# Patient Record
Sex: MALE | Race: WHITE | Employment: OTHER | URBAN - METROPOLITAN AREA
[De-identification: names, ages, dates, MRNs, and addresses within clinical notes are randomized per-mention and may not be internally consistent; named-entity substitution may affect disease eponyms.]

---

## 2017-02-15 DIAGNOSIS — N52.8 OTHER MALE ERECTILE DYSFUNCTION: ICD-10-CM

## 2017-02-16 RX ORDER — SILDENAFIL 100 MG/1
100 TABLET, FILM COATED ORAL DAILY PRN
Qty: 6 TABLET | Refills: 2 | Status: SHIPPED | OUTPATIENT
Start: 2017-02-16 | End: 2018-03-19

## 2017-02-16 NOTE — TELEPHONE ENCOUNTER
Prescription approved per [facility] Refill Protocol.  Kiersten Auguste PharmD   [facility]  [email or web address].

## 2017-08-09 DIAGNOSIS — K21.9 ESOPHAGEAL REFLUX: Primary | ICD-10-CM

## 2017-08-09 NOTE — TELEPHONE ENCOUNTER
Omeprazole       Last Written Prescription Date: 8/31/16  Last Fill Quantity: 90,  # refills: 3   Last Office Visit with [facility], [facility] or [facility] prescribing provider: 11/1/16                                         Next 5 appointments (look out 90 days)     Sep 05, 2017  8:20 AM CDT   PHYSICAL with Michael Ni MD   [facility] ([facility])    [address]  [facility] [id number] [phone]

## 2017-09-05 ENCOUNTER — OFFICE VISIT (OUTPATIENT)
Dept: INTERNAL MEDICINE | Facility: CLINIC | Age: 77
End: 2017-09-05
Payer: MEDICARE

## 2017-09-05 VITALS
TEMPERATURE: 97.8 F | WEIGHT: 203.9 LBS | OXYGEN SATURATION: 98 % | BODY MASS INDEX: 27.02 KG/M2 | DIASTOLIC BLOOD PRESSURE: 78 MMHG | HEIGHT: 73 IN | SYSTOLIC BLOOD PRESSURE: 134 MMHG | HEART RATE: 84 BPM

## 2017-09-05 DIAGNOSIS — Z12.5 SPECIAL SCREENING FOR MALIGNANT NEOPLASM OF PROSTATE: ICD-10-CM

## 2017-09-05 DIAGNOSIS — Z23 NEED FOR PNEUMOCOCCAL VACCINATION: ICD-10-CM

## 2017-09-05 DIAGNOSIS — K21.9 GASTROESOPHAGEAL REFLUX DISEASE WITHOUT ESOPHAGITIS: ICD-10-CM

## 2017-09-05 DIAGNOSIS — R39.198 SLOWING OF URINARY STREAM: ICD-10-CM

## 2017-09-05 DIAGNOSIS — Z00.00 ROUTINE GENERAL MEDICAL EXAMINATION AT A HEALTH CARE FACILITY: Primary | ICD-10-CM

## 2017-09-05 LAB
ALBUMIN SERPL-MCNC: 3.9 G/DL (ref 3.4–5)
ALP SERPL-CCNC: 60 U/L (ref 40–150)
ALT SERPL W P-5'-P-CCNC: 24 U/L (ref 0–70)
ANION GAP SERPL CALCULATED.3IONS-SCNC: 6 MMOL/L (ref 3–14)
AST SERPL W P-5'-P-CCNC: 17 U/L (ref 0–45)
BILIRUB SERPL-MCNC: 0.6 MG/DL (ref 0.2–1.3)
BUN SERPL-MCNC: 15 MG/DL (ref 7–30)
CALCIUM SERPL-MCNC: 9.3 MG/DL (ref 8.5–10.1)
CHLORIDE SERPL-SCNC: 104 MMOL/L (ref 94–109)
CHOLEST SERPL-MCNC: 199 MG/DL
CO2 SERPL-SCNC: 30 MMOL/L (ref 20–32)
CREAT SERPL-MCNC: 0.96 MG/DL (ref 0.66–1.25)
ERYTHROCYTE [DISTWIDTH] IN BLOOD BY AUTOMATED COUNT: 13.2 % (ref 10–15)
GFR SERPL CREATININE-BSD FRML MDRD: 76 ML/MIN/1.7M2
GLUCOSE SERPL-MCNC: 102 MG/DL (ref 70–99)
HCT VFR BLD AUTO: 40.5 % (ref 40–53)
HDLC SERPL-MCNC: 61 MG/DL
HGB BLD-MCNC: 13.1 G/DL (ref 13.3–17.7)
LDLC SERPL CALC-MCNC: 118 MG/DL
MCH RBC QN AUTO: 28.1 PG (ref 26.5–33)
MCHC RBC AUTO-ENTMCNC: 32.3 G/DL (ref 31.5–36.5)
MCV RBC AUTO: 87 FL (ref 78–100)
NONHDLC SERPL-MCNC: 138 MG/DL
PLATELET # BLD AUTO: 133 10E9/L (ref 150–450)
POTASSIUM SERPL-SCNC: 4.6 MMOL/L (ref 3.4–5.3)
PROT SERPL-MCNC: 7.1 G/DL (ref 6.8–8.8)
PSA SERPL-ACNC: 4.08 UG/L (ref 0–4)
RBC # BLD AUTO: 4.67 10E12/L (ref 4.4–5.9)
SODIUM SERPL-SCNC: 140 MMOL/L (ref 133–144)
TRIGL SERPL-MCNC: 101 MG/DL
TSH SERPL DL<=0.005 MIU/L-ACNC: 1.76 MU/L (ref 0.4–4)
WBC # BLD AUTO: 5.9 10E9/L (ref 4–11)

## 2017-09-05 PROCEDURE — 90670 PCV13 VACCINE IM: CPT | Performed by: INTERNAL MEDICINE

## 2017-09-05 PROCEDURE — G0009 ADMIN PNEUMOCOCCAL VACCINE: HCPCS | Performed by: INTERNAL MEDICINE

## 2017-09-05 PROCEDURE — G0439 PPPS, SUBSEQ VISIT: HCPCS | Performed by: INTERNAL MEDICINE

## 2017-09-05 PROCEDURE — 80061 LIPID PANEL: CPT | Performed by: INTERNAL MEDICINE

## 2017-09-05 PROCEDURE — 36415 COLL VENOUS BLD VENIPUNCTURE: CPT | Performed by: INTERNAL MEDICINE

## 2017-09-05 PROCEDURE — 85027 COMPLETE CBC AUTOMATED: CPT | Performed by: INTERNAL MEDICINE

## 2017-09-05 PROCEDURE — 80053 COMPREHEN METABOLIC PANEL: CPT | Performed by: INTERNAL MEDICINE

## 2017-09-05 PROCEDURE — G0103 PSA SCREENING: HCPCS | Performed by: INTERNAL MEDICINE

## 2017-09-05 PROCEDURE — 84443 ASSAY THYROID STIM HORMONE: CPT | Performed by: INTERNAL MEDICINE

## 2017-09-05 RX ORDER — FINASTERIDE 5 MG/1
1 TABLET, FILM COATED ORAL DAILY
Qty: 90 TABLET | Refills: 3 | Status: SHIPPED | OUTPATIENT
Start: 2017-09-05 | End: 2018-09-12

## 2017-09-05 NOTE — PROGRESS NOTES
SUBJECTIVE:   Jack Bro is a 77 year old male who presents for Preventive Visit.    Are you in the first 12 months of your Medicare Part B coverage?  No    Healthy Habits:    Do you get at least three servings of calcium containing foods daily (dairy, green leafy vegetables, etc.)? yes    Amount of exercise or daily activities, outside of work: 3 day(s) per week    Problems taking medications regularly No    Medication side effects: Yes , gas from the Omeprazole    Have you had an eye exam in the past two years? yes    Do you see a dentist twice per year? yes    Do you have sleep apnea, excessive snoring or daytime drowsiness?no    Gas  The patient relates gas from the omeprazole. He states that currently he takes capsule pills.    Exercise   The patient relates he walks regularly. He reports walking for at least 30 minutes per day, 5 days a week.    Past/recent records reviewed and discussed for:  Follows with a chiropractor for lower back pain.  -Right inguinal herniorrhaphy in 11/2016.  -Colonoscopy in 2014, normal. No repeat needed due to age.    COGNITIVE SCREEN  1) Repeat 3 items (Banana, Sunrise, Chair)    2) Clock draw: NORMAL  3) 3 item recall: Recalls 3 objects  Results: 3 items recalled: COGNITIVE IMPAIRMENT LESS LIKELY    Mini-CogTM Copyright S Dontae. Licensed by the author for use in [facility]; reprinted with permission (sajan@.[email or web address]). All rights reserved.      Reviewed and updated as needed this visit by clinical staffTobacco  Allergies  Meds  Med Hx  Surg Hx  Fam Hx  Soc Hx        Reviewed and updated as needed this visit by Provider        Social History   Substance Use Topics     Smoking status: Former Smoker     Packs/day: 0.50     Years: 20.00     Types: Cigarettes     Quit date: 9/22/1984     Smokeless tobacco: Never Used     Alcohol use 0.0 oz/week     0 Standard drinks or equivalent per week      Comment: About two drinks weekly       The patient does not drink >3  drinks per day nor >7 drinks per week.    Today's PHQ-2 Score:   PHQ-2 ( 1999 Pfizer) 9/5/2017 8/31/2016   Q1: Little interest or pleasure in doing things 0 0   Q2: Feeling down, depressed or hopeless 0 0   PHQ-2 Score 0 0         Do you feel safe in your environment - Yes    Do you have a Health Care Directive?: Yes: Patient states has Advance Directive and will bring in a copy to clinic.    Current providers sharing in care for this patient include: Patient Care Team:  Michael Ni MD as PCP - General      Hearing impairment: Yes, wears hearing aids    Ability to successfully perform activities of daily living: Yes, no assistance needed     Fall risk:  Fallen 2 or more times in the past year?: No  Any fall with injury in the past year?: No      Home safety:  none identified      The following health maintenance items are reviewed in Epic and correct as of today:Health Maintenance   Topic Date Due     PNEUMOCOCCAL (2 of 2 - PCV13) 03/11/2009     MEDICARE ANNUAL WELLNESS VISIT  08/31/2017     FALL RISK ASSESSMENT  08/31/2017     INFLUENZA VACCINE (SYSTEM ASSIGNED)  09/01/2017     ADVANCE DIRECTIVE PLANNING Q5 YRS  06/13/2021     LIPID MONITORING Q5 YEARS  08/31/2021     TETANUS Q10 YR  09/14/2022     COLONOSCOPY Q10 YR  08/05/2024       Pneumonia Vaccine:Adults age 65+ who received Pneumovax (PPSV23) at 65 years or older: Should be given PCV13 > 1 year after their most recent PPSV23    ROS:  C: POSITIVE for difficulty sleeping, NEGATIVE for fever, chills, change in weight  I: NEGATIVE for worrisome rashes, moles or lesions  E: POSITIVE for cataract of left eye  E/M: NEGATIVE for ear, mouth and throat problems  R: NEGATIVE for significant cough or SOB  B: NEGATIVE for masses, tenderness or discharge  CV: NEGATIVE for chest pain, palpitations or peripheral edema  GI: POSITIVE for abdominal gas, NEGATIVE for nausea, abdominal pain, heartburn, or change in bowel habits  : NEGATIVE for frequency, dysuria, or  "hematuria  M:POSITIVE for lower back pain  N: NEGATIVE for weakness, dizziness or paresthesias  E: NEGATIVE for temperature intolerance, skin/hair changes  H: NEGATIVE for bleeding problems  P: NEGATIVE for changes in mood or affect    This document serves as a record of the services and decisions personally performed and made by Michael Ni MD. It was created on his behalf by Dolly Mederos, a trained medical scribe. The creation of this document is based on the provider's statements to the medical scribe.  Dolly Mederos September 5, 2017 9:11 AM     OBJECTIVE:   /78 (BP Location: Right arm, Patient Position: Sitting, Cuff Size: Adult Large)  Pulse 84  Temp 97.8  F (36.6  C) (Oral)  Ht 6' 0.83\" (1.85 m)  Wt 203 lb 14.4 oz (92.5 kg)  SpO2 98%  BMI 27.02 kg/m2 Estimated body mass index is 27.02 kg/(m^2) as calculated from the following:    Height as of this encounter: 6' 0.83\" (1.85 m).    Weight as of this encounter: 203 lb 14.4 oz (92.5 kg).  EXAM:   GENERAL: healthy, alert and no distress  EYES: Eyes grossly normal to inspection, PERRL and conjunctivae and sclerae normal  HENT: Wax in both ear canals, unable to remove with curette. TM's not visible, nose and mouth without ulcers or lesions  NECK: no adenopathy, no asymmetry, masses, or scars and thyroid normal to palpation  RESP: lungs clear to auscultation - no rales, rhonchi or wheezes  CV: regular rate and rhythm, normal S1 S2, no S3 or S4, no murmur, click or rub, no peripheral edema and peripheral pulses strong  ABDOMEN: soft, nontender, no hepatosplenomegaly, no masses and bowel sounds normal   (male): normal male genitalia without lesions or urethral discharge, no hernia  RECTAL: normal sphincter tone, no rectal masses, prostate normal size, smooth, nontender without nodules or masses  MS: no gross musculoskeletal defects noted, no edema  SKIN: no suspicious lesions or rashes  NEURO: Normal strength and tone, mentation intact and speech " "normal  PSYCH: mentation appears normal, affect normal/bright  LYMPH: no cervical, supraclavicular, axillary, or inguinal adenopathy    ASSESSMENT / PLAN:   (Z00.00) Routine general medical examination at a health care facility  (primary encounter diagnosis)  Comment: Stable health. See epic orders.   Plan: Comprehensive metabolic panel, Lipid panel         reflex to direct LDL, TSH with free T4 reflex,         CBC with platelets        Follow up yearly.    (K21.9) Gastroesophageal reflux disease without esophagitis  Comment: Well controlled. Continue current meds.  Plan: omeprazole (PRILOSEC) 20 MG CR capsule          (R39.198) Slowing of urinary stream  Comment: Well controlled. Continue current meds.   Plan: finasteride (PROSCAR) 5 MG tablet          (Z23) Need for pneumococcal vaccination  Plan: Pneumococcal vaccine 13 valent PCV13 IM         (Prevnar) [45223], ADMIN MEDICARE: Pneumococcal        Vaccine ()          (Z12.5) Special screening for malignant neoplasm of prostate  Plan: Prostate spec antigen screen    End of Life Planning:  Patient currently has an advanced directive: Yes.  Practitioner is supportive of decision.    COUNSELING:  Reviewed preventive health counseling, as reflected in patient instructions  Special attention given to:       Regular exercise       Healthy diet/nutrition       Immunizations    Vaccinated for: Pneumococcal         Colon cancer screening       Prostate cancer screening      BP Screening:   Last 3 BP Readings:    BP Readings from Last 3 Encounters:   09/05/17 134/78   11/10/16 136/85   11/01/16 138/70       The following was recommended to the patient:  Re-screen BP within a year and recommended lifestyle modifications    Estimated body mass index is 27.02 kg/(m^2) as calculated from the following:    Height as of this encounter: 6' 0.83\" (1.85 m).    Weight as of this encounter: 203 lb 14.4 oz (92.5 kg).     reports that he quit smoking about 32 years ago. His smoking " use included Cigarettes. He has a 10.00 pack-year smoking history. He has never used smokeless tobacco.      Appropriate preventive services were discussed with this patient, including applicable screening as appropriate for cardiovascular disease, diabetes, osteopenia/osteoporosis, and glaucoma.  As appropriate for age/gender, discussed screening for colorectal cancer, prostate cancer, breast cancer, and cervical cancer. Checklist reviewing preventive services available has been given to the patient.    Reviewed patients plan of care and provided an AVS. The Basic Care Plan (routine screening as documented in Health Maintenance) for Jack meets the Care Plan requirement. This Care Plan has been established and reviewed with the Patient.    Counseling Resources:  ATP IV Guidelines  Pooled Cohorts Equation Calculator  Breast Cancer Risk Calculator  FRAX Risk Assessment  ICSI Preventive Guidelines  Dietary Guidelines for Americans, 2010  USDA's MyPlate  ASA Prophylaxis  Lung CA Screening    The information in this document, created by the medical scribe for me, accurately reflects the services I personally performed and the decisions made by me. I have reviewed and approved this document for accuracy prior to leaving the patient care area.  September 5, 2017 9:11 AM    Michael Ni MD  [facility]      ADDENDUM (9/28/2017):  Patient spoke with RN on 9/27/2017 and reports no significant interval problems or health concerns.   He is a suitable surgical candidate for cataract repair with anesthesia as indicated.  No need to take medications the AM of surgery.     Michael Ni MD   September 28, 2017   1400

## 2017-09-05 NOTE — NURSING NOTE
"Chief Complaint   Patient presents with     Medicare Visit       Initial /78 (BP Location: Right arm, Patient Position: Sitting, Cuff Size: Adult Large)  Pulse 84  Temp 97.8  F (36.6  C) (Oral)  Ht 6' 0.83\" (1.85 m)  Wt 203 lb 14.4 oz (92.5 kg)  SpO2 98%  BMI 27.02 kg/m2 Estimated body mass index is 27.02 kg/(m^2) as calculated from the following:    Height as of this encounter: 6' 0.83\" (1.85 m).    Weight as of this encounter: 203 lb 14.4 oz (92.5 kg).  Medication Reconciliation: complete   Nash CONN      "

## 2017-09-05 NOTE — NURSING NOTE
Screening Questionnaire for Adult Immunization    Are you sick today?   No   Do you have allergies to medications, food, a vaccine component or latex?   No   Have you ever had a serious reaction after receiving a vaccination?   No   Do you have a long-term health problem with heart disease, lung disease, asthma, kidney disease, metabolic disease (e.g. diabetes), anemia, or other blood disorder?   No   Do you have cancer, leukemia, HIV/AIDS, or any other immune system problem?   No   In the past 3 months, have you taken medications that affect  your immune system, such as prednisone, other steroids, or anticancer drugs; drugs for the treatment of rheumatoid arthritis, Crohn s disease, or psoriasis; or have you had radiation treatments?   No   Have you had a seizure, or a brain or other nervous system problem?   No   During the past year, have you received a transfusion of blood or blood     products, or been given immune (gamma) globulin or antiviral drug?   No   For women: Are you pregnant or is there a chance you could become        pregnant during the next month?   No   Have you received any vaccinations in the past 4 weeks?   No     Immunization questionnaire answers were all negative.        Per orders of Dr. Ni, injection of Prevnar 13 given by Nabila Jimenez. Patient instructed to remain in clinic for 15 minutes afterwards, and to report any adverse reaction to me immediately.       Screening performed by Nabila Jimenez on 9/5/2017 at 9:50 AM.

## 2017-09-05 NOTE — MR AVS SNAPSHOT
After Visit Summary   9/5/2017    Jack Bro    MRN: [id number]           Patient Information     Date Of Birth          1940        Visit Information        Provider Department      9/5/2017 8:20 AM Michael Ni MD [facility]        Today's Diagnoses     Routine general medical examination at a health care facility    -  1    Gastroesophageal reflux disease without esophagitis        Slowing of urinary stream        Need for pneumococcal vaccination        Special screening for malignant neoplasm of prostate          Care Instructions      Preventive Health Recommendations:       Male Ages 65 and over    Yearly exam:             See your health care provider every year in order to  o   Review health changes.   o   Discuss preventive care.    o   Review your medicines if your doctor has prescribed any.    Talk with your health care provider about whether you should have a test to screen for prostate cancer (PSA).    Every 3 years, have a diabetes test (fasting glucose). If you are at risk for diabetes, you should have this test more often.    Every 5 years, have a cholesterol test. Have this test more often if you are at risk for high cholesterol or heart disease.     Every 10 years, have a colonoscopy. Or, have a yearly FIT test (stool test). These exams will check for colon cancer.    Talk to with your health care provider about screening for Abdominal Aortic Aneurysm if you have a family history of AAA or have a history of smoking.  Shots:     Get a flu shot each year.     Get a tetanus shot every 10 years.     Talk to your doctor about your pneumonia vaccines. There are now two you should receive - Pneumovax (PPSV 23) and Prevnar (PCV 13).    Talk to your doctor about a shingles vaccine.     Talk to your doctor about the hepatitis B vaccine.  Nutrition:     Eat at least 5 servings of fruits and vegetables each day.     Eat whole-grain bread, whole-wheat pasta and  brown rice instead of white grains and rice.     Talk to your doctor about Calcium and Vitamin D.   Lifestyle    Exercise for at least 150 minutes a week (30 minutes a day, 5 days a week). This will help you control your weight and prevent disease.     Limit alcohol to one drink per day.     No smoking.     Wear sunscreen to prevent skin cancer.     See your dentist every six months for an exam and cleaning.     See your eye doctor every 1 to 2 years to screen for conditions such as glaucoma, macular degeneration and cataracts.      Everything looks fine!    Refills of medications have been faxed to your pharmacy.     I'll get back to you with lab results soon, especially if there is anything of concern.      See you in a year, sooner if problems.            Follow-ups after your visit        Who to contact     If you have questions or need follow up information about today's clinic visit or your schedule please contact [facility] directly at [phone].  Normal or non-critical lab and imaging results will be communicated to you by Virtifyhart, letter or phone within 4 business days after the clinic has received the results. If you do not hear from us within 7 days, please contact the clinic through Evaneost or phone. If you have a critical or abnormal lab result, we will notify you by phone as soon as possible.  Submit refill requests through Smart[employer] Systems or call your pharmacy and they will forward the refill request to us. Please allow 3 business days for your refill to be completed.          Additional Information About Your Visit        Smart[employer] Systems Information     [employer] gives you secure access to your electronic health record. If you see a primary care provider, you can also send messages to your care team and make appointments. If you have questions, please call your primary care clinic.  If you do not have a primary care provider, please call [phone] and they will assist you.        Care EveryWhere  "ID     This is your Care EveryWhere ID. This could be used by other organizations to access your [facility] medical records  [id number]        Your Vitals Were     Pulse Temperature Height Pulse Oximetry BMI (Body Mass Index)       84 97.8  F (36.6  C) (Oral) 6' 0.83\" (1.85 m) 98% 27.02 kg/m2        Blood Pressure from Last 3 Encounters:   09/05/17 134/78   11/10/16 136/85   11/01/16 138/70    Weight from Last 3 Encounters:   09/05/17 203 lb 14.4 oz (92.5 kg)   11/10/16 208 lb (94.3 kg)   11/01/16 207 lb (93.9 kg)              We Performed the Following     ADMIN MEDICARE: Pneumococcal Vaccine ()     CBC with platelets     Comprehensive metabolic panel     Lipid panel reflex to direct LDL     Pneumococcal vaccine 13 valent PCV13 IM (Prevnar) [19624]     Prostate spec antigen screen     TSH with free T4 reflex          Today's Medication Changes          These changes are accurate as of: 9/5/17  9:06 AM.  If you have any questions, ask your nurse or doctor.               These medicines have changed or have updated prescriptions.        Dose/Directions    omeprazole 20 MG CR capsule   Commonly known as:  priLOSEC   This may have changed:  See the new instructions.   Used for:  Gastroesophageal reflux disease without esophagitis   Changed by:  Michael Ni MD        Dose:  20 mg   Take 1 capsule (20 mg) by mouth daily   Quantity:  90 capsule   Refills:  0         Stop taking these medicines if you haven't already. Please contact your care team if you have questions.     omeprazole 20 MG tablet   Commonly known as:  priLOSEC OTC   Stopped by:  Michael Ni MD                Where to get your medicines      These medications were sent to [facility] [place], [place] - [facility] [address]  [facility] [address] [facility] [address]     Phone:  [phone]     finasteride 5 MG tablet    omeprazole 20 MG CR capsule                Primary Care Provider Office Phone # Fax #    Michael Ni, " [phone]       [address] [facility] [address]  [facility] [address]        Equal Access to Services     NASH BLACKMON : Hadii luis m arellano kamryn Rodriguez, waanniada lugirma, virginiata kadevinda arjun, fransico gibson. So [facility] [phone].    ATENCIÓN: Si habla español, tiene a tolentino disposición servicios gratuitos de asistencia lingüística. Llame al [phone].    We comply with applicable federal civil rights laws and [place] laws. We do not discriminate on the basis of race, color, national origin, age, disability sex, sexual orientation or gender identity.            Thank you!     Thank you for choosing [facility]  for your care. Our goal is always to provide you with excellent care. Hearing back from our patients is one way we can continue to improve our services. Please take a few minutes to complete the written survey that you may receive in the mail after your visit with us. Thank you!             Your Updated Medication List - Protect others around you: Learn how to safely use, store and throw away your medicines at www.disposemymeds.org.          This list is accurate as of: 9/5/17  9:06 AM.  Always use your most recent med list.                   Brand Name Dispense Instructions for use Diagnosis    finasteride 5 MG tablet    PROSCAR    90 tablet    Take 1 tablet (5 mg) by mouth daily    Slowing of urinary stream       ibuprofen 200 MG tablet    ADVIL/MOTRIN     Take 200 mg by mouth at onset of headache. 3 at night        Multi-vitamin Tabs tablet   Generic drug:  multivitamin, therapeutic with minerals      1 tab daily        omeprazole 20 MG CR capsule    priLOSEC    90 capsule    Take 1 capsule (20 mg) by mouth daily    Gastroesophageal reflux disease without esophagitis       sildenafil 100 MG cap/tab    VIAGRA    6 tablet    Take 1 tablet (100 mg) by mouth daily as needed for erectile dysfunction at least 30 minutes before intercourse.    Other male  erectile dysfunction

## 2017-09-27 ENCOUNTER — TELEPHONE (OUTPATIENT)
Dept: INTERNAL MEDICINE | Facility: CLINIC | Age: 77
End: 2017-09-27

## 2017-09-27 RX ORDER — NICOTINE POLACRILEX 4 MG/1
20 GUM, CHEWING ORAL DAILY
Qty: 30 TABLET | COMMUNITY
Start: 2017-09-27 | End: 2018-09-12

## 2017-09-27 NOTE — TELEPHONE ENCOUNTER
Pt calling.    1.  Having L cataract surgery on 10-24-17 with Dr. Jacques @ [facility].  Asking if PCP can addend the routine physical appt on 9-5-17.    If ok, please addend to reflect the preop and then fax to [facility] @ [phone].    2.  Pt states he is taking Omeprazole 20mg tabs (not capsules) d/t capsules caused flatulence.  And is paying out of pocket for tablets  Updated on med list. .    Please advise re: addending preop, thanks.  (Ok to hold for PCP.)

## 2017-09-28 NOTE — TELEPHONE ENCOUNTER
Placed addendum on 9/5/2017 note with approval to proceed with cataract surgery.   May forward to surgeon.   Please advise pt.

## 2017-10-05 ENCOUNTER — TRANSFERRED RECORDS (OUTPATIENT)
Dept: HEALTH INFORMATION MANAGEMENT | Facility: CLINIC | Age: 77
End: 2017-10-05

## 2017-10-17 ENCOUNTER — TELEPHONE (OUTPATIENT)
Dept: INTERNAL MEDICINE | Facility: CLINIC | Age: 77
End: 2017-10-17

## 2017-10-17 NOTE — TELEPHONE ENCOUNTER
Pt calls, asks if everything is in order for cataract surgery next week. Pt states he had a physical done in September and Dr. Ni was going to place addendum on this. Informed pt that addendum was placed on physical from 9/5/17 and faxed to [facility]. Recommended pt contact their office to ensure nothing further is needed. Pt agrees with plan.

## 2018-02-26 ENCOUNTER — TELEPHONE (OUTPATIENT)
Dept: INTERNAL MEDICINE | Facility: CLINIC | Age: 78
End: 2018-02-26

## 2018-02-26 DIAGNOSIS — Z20.828 EXPOSURE TO INFLUENZA: Primary | ICD-10-CM

## 2018-02-26 NOTE — TELEPHONE ENCOUNTER
Influenza-Like Illness ([facility]) Protocol    Jack Bro      Age: 77 year old     YOB: 1940    Are you currently sick or have you had close contact with someone who is currently sick?   This patient is not currently sick but has had close contact with someone who was.  Pt's wife was diagnosed with Influenza B in the hospital on Friday, she was discharged yesterday and is receiving Tamiflu. Pt does not have any symptoms, but asks if he should get prescription for Tamiflu to prevent him from getting it.     Evaluation for Possible Influenza Exposure  (ADULTS)    Below are conditions which place adults at increased risk for the more severe complications of influenza.    Does this patient have ANY of the following conditions?  Chronic pulmonary disease such as asthma or COPD No   Heart disease (CHF, CAD, anticoag due to arrhythmia) No   Liver disease (hepatitis, liver failure, cirrhosis) No   Kidney disease (renal failure, insufficiency or dialysis) No   Metabolic disorder (e.g. diabetes) No   Neuromuscular disorder (JUANJO VALLE MD, myasthenia gravis) No   Compromised ability to handle respiratory secretions No   Hematologic disorder (e.g. sickle cell disease) No   HIV / AIDS No   Chemotherapy or radiation within the last 3 months No   Received an organ or a bone marrow transplant No   Taking Prednisone in excess of 20mg daily No   Is age 65 years or older Yes   Is pregnant, thinks she may be pregnant or is within two weeks after delivery No   Is a resident of a chronic care facility No   Is patient  or [place] native  No     Nursing Plan  Does this patient have ANY of the above conditions?    Yes.  Plan: Routed chart to provider  To review if pt can get prescription for Tamiflu.     Provided home care instructions    If further questions/concerns or if new symptoms develop, call your PCP or [facility] as soon as possible.    When to seek medical attention    Contact your health  care provider right away if you experience:    A painful sore throat accompanied by fever persists for more than 48 hours    Ear pain, sinus pain, persistent vomiting and/or diarrhea    Oral temperature greater than 104  Fahrenheit (40  Celsius)    Dehydration (e.g., mouth feeling dry, dizzy when sitting/standing, decreased urine output)    Severe or persistent vomiting; unable to keep fluids down    Improvement in flu-like symptoms (fever and cough or sore throat) but then return of fever and worse cough or sore throat    Not drinking enough fluid    Any other concerns not stated above      Additional educational resources include:    [email or web address]    http://www.cdc.gov/flu/  Liza Mock

## 2018-02-27 RX ORDER — OSELTAMIVIR PHOSPHATE 75 MG/1
75 CAPSULE ORAL DAILY
Qty: 10 CAPSULE | Refills: 0 | Status: SHIPPED | OUTPATIENT
Start: 2018-02-27 | End: 2018-09-12

## 2018-03-19 DIAGNOSIS — N52.8 OTHER MALE ERECTILE DYSFUNCTION: ICD-10-CM

## 2018-03-21 RX ORDER — SILDENAFIL 100 MG/1
100 TABLET, FILM COATED ORAL DAILY PRN
Qty: 6 TABLET | Refills: 2 | Status: SHIPPED | OUTPATIENT
Start: 2018-03-21 | End: 2018-09-12

## 2018-09-12 ENCOUNTER — OFFICE VISIT (OUTPATIENT)
Dept: INTERNAL MEDICINE | Facility: CLINIC | Age: 78
End: 2018-09-12
Payer: MEDICARE

## 2018-09-12 VITALS
HEART RATE: 84 BPM | WEIGHT: 198 LBS | TEMPERATURE: 98.2 F | OXYGEN SATURATION: 95 % | HEIGHT: 72 IN | DIASTOLIC BLOOD PRESSURE: 72 MMHG | BODY MASS INDEX: 26.82 KG/M2 | RESPIRATION RATE: 16 BRPM | SYSTOLIC BLOOD PRESSURE: 130 MMHG

## 2018-09-12 DIAGNOSIS — K21.9 GASTROESOPHAGEAL REFLUX DISEASE WITHOUT ESOPHAGITIS: ICD-10-CM

## 2018-09-12 DIAGNOSIS — R97.20 ELEVATED PROSTATE SPECIFIC ANTIGEN (PSA): ICD-10-CM

## 2018-09-12 DIAGNOSIS — Z12.5 SPECIAL SCREENING FOR MALIGNANT NEOPLASM OF PROSTATE: ICD-10-CM

## 2018-09-12 DIAGNOSIS — Z00.00 ROUTINE GENERAL MEDICAL EXAMINATION AT A HEALTH CARE FACILITY: Primary | ICD-10-CM

## 2018-09-12 DIAGNOSIS — N52.9 ERECTILE DYSFUNCTION, UNSPECIFIED ERECTILE DYSFUNCTION TYPE: ICD-10-CM

## 2018-09-12 DIAGNOSIS — Z13.6 CARDIOVASCULAR SCREENING; LDL GOAL LESS THAN 160: ICD-10-CM

## 2018-09-12 DIAGNOSIS — R39.198 SLOWING OF URINARY STREAM: ICD-10-CM

## 2018-09-12 DIAGNOSIS — R73.09 ELEVATED GLUCOSE: ICD-10-CM

## 2018-09-12 LAB
ERYTHROCYTE [DISTWIDTH] IN BLOOD BY AUTOMATED COUNT: 13.7 % (ref 10–15)
HBA1C MFR BLD: 5.8 % (ref 0–5.6)
HCT VFR BLD AUTO: 42.4 % (ref 40–53)
HGB BLD-MCNC: 13.7 G/DL (ref 13.3–17.7)
MCH RBC QN AUTO: 28.4 PG (ref 26.5–33)
MCHC RBC AUTO-ENTMCNC: 32.3 G/DL (ref 31.5–36.5)
MCV RBC AUTO: 88 FL (ref 78–100)
PLATELET # BLD AUTO: 152 10E9/L (ref 150–450)
RBC # BLD AUTO: 4.82 10E12/L (ref 4.4–5.9)
WBC # BLD AUTO: 4.6 10E9/L (ref 4–11)

## 2018-09-12 PROCEDURE — G0438 PPPS, INITIAL VISIT: HCPCS | Performed by: INTERNAL MEDICINE

## 2018-09-12 PROCEDURE — 80061 LIPID PANEL: CPT | Performed by: INTERNAL MEDICINE

## 2018-09-12 PROCEDURE — 83036 HEMOGLOBIN GLYCOSYLATED A1C: CPT | Performed by: INTERNAL MEDICINE

## 2018-09-12 PROCEDURE — 85027 COMPLETE CBC AUTOMATED: CPT | Performed by: INTERNAL MEDICINE

## 2018-09-12 PROCEDURE — 84443 ASSAY THYROID STIM HORMONE: CPT | Performed by: INTERNAL MEDICINE

## 2018-09-12 PROCEDURE — G0103 PSA SCREENING: HCPCS | Performed by: INTERNAL MEDICINE

## 2018-09-12 PROCEDURE — 80053 COMPREHEN METABOLIC PANEL: CPT | Performed by: INTERNAL MEDICINE

## 2018-09-12 PROCEDURE — 36415 COLL VENOUS BLD VENIPUNCTURE: CPT | Performed by: INTERNAL MEDICINE

## 2018-09-12 RX ORDER — TADALAFIL 20 MG/1
20 TABLET ORAL DAILY PRN
Qty: 6 TABLET | Refills: 11 | Status: ON HOLD | OUTPATIENT
Start: 2018-09-12 | End: 2018-11-19

## 2018-09-12 RX ORDER — FINASTERIDE 5 MG/1
1 TABLET, FILM COATED ORAL DAILY
Qty: 90 TABLET | Refills: 3 | Status: SHIPPED | OUTPATIENT
Start: 2018-09-12 | End: 2019-02-22

## 2018-09-12 RX ORDER — SILDENAFIL 100 MG/1
100 TABLET, FILM COATED ORAL DAILY PRN
Qty: 6 TABLET | Refills: 11 | Status: SHIPPED | OUTPATIENT
Start: 2018-09-12

## 2018-09-12 ASSESSMENT — ACTIVITIES OF DAILY LIVING (ADL)
CURRENT_FUNCTION: NO ASSISTANCE NEEDED
I_NEED_ASSISTANCE_FOR_THE_FOLLOWING_DAILY_ACTIVITIES:: NO ASSISTANCE IS NEEDED

## 2018-09-12 NOTE — PATIENT INSTRUCTIONS
Preventive Health Recommendations:       Male Ages 65 and over    Yearly exam:             See your health care provider every year in order to  o   Review health changes.   o   Discuss preventive care.    o   Review your medicines if your doctor has prescribed any.    Talk with your health care provider about whether you should have a test to screen for prostate cancer (PSA).    Every 3 years, have a diabetes test (fasting glucose). If you are at risk for diabetes, you should have this test more often.    Every 5 years, have a cholesterol test. Have this test more often if you are at risk for high cholesterol or heart disease.     Every 10 years, have a colonoscopy. Or, have a yearly FIT test (stool test). These exams will check for colon cancer.    Talk to with your health care provider about screening for Abdominal Aortic Aneurysm if you have a family history of AAA or have a history of smoking.  Shots:     Get a flu shot each year.     Get a tetanus shot every 10 years.     Talk to your doctor about your pneumonia vaccines. There are now two you should receive - Pneumovax (PPSV 23) and Prevnar (PCV 13).    Talk to your pharmacist about a shingles vaccine.     Talk to your doctor about the hepatitis B vaccine.  Nutrition:     Eat at least 5 servings of fruits and vegetables each day.     Eat whole-grain bread, whole-wheat pasta and brown rice instead of white grains and rice.     Get adequate Calcium and Vitamin D.   Lifestyle    Exercise for at least 150 minutes a week (30 minutes a day, 5 days a week). This will help you control your weight and prevent disease.     Limit alcohol to one drink per day.     No smoking.     Wear sunscreen to prevent skin cancer.     See your dentist every six months for an exam and cleaning.     See your eye doctor every 1 to 2 years to screen for conditions such as glaucoma, macular degeneration and cataracts.      Everything looks fine!    Refills of medications have been faxed  to your pharmacy. (Written prescriptions provided for maximum dose of either Viagra or Cialis).    I'll get back to you with lab results soon, especially if there is anything of concern.      See you in a year, sooner if problems.

## 2018-09-12 NOTE — PROGRESS NOTES
SUBJECTIVE:   Jack Bro is a 78 year old male who presents for Preventive Visit.    Are you in the first 12 months of your Medicare Part B coverage?  No    Healthy Habits:  Answers for HPI/ROS submitted by the patient on 9/12/2018   Annual Exam:  Getting at least 3 servings of Calcium per day:: Yes  Bi-annual eye exam:: Yes  Dental care twice a year:: Yes  Sleep apnea or symptoms of sleep apnea:: None  Diet:: Regular (no restrictions)  Frequency of exercise:: 4-5 days/week  Taking medications regularly:: Yes  Medication side effects:: Other  Additional concerns today:: YES  Activities of Daily Living: no assistance needed  Home safety: no safety concerns identified  Hearing Impairment:: no hearing concerns  PHQ-2 Score: 0  Duration of exercise:: 45-60 minutes    COGNITIVE SCREEN  1) Repeat 3 items (Leader, Season, Table)    2) Clock draw: NORMAL  3) 3 item recall: Recalls 3 objects  Results: 3 items recalled: COGNITIVE IMPAIRMENT LESS LIKELY    Mini-CogTM Copyright S Dontae. Licensed by the author for use in [facility]; reprinted with permission (sajan@[email or web address]). All rights reserved.      Past/recent records reviewed and discussed for:  -Will have second cataract surgery 11/2018.   -family medical hx, social hx, medical hx, medications, and immunizations.     Slowing of urinary stream  Taking proscar 5 mg daily. PSA 9/2017 was minimally elevated, 4.08 micrograms/L. Patient deferred urology and prefers to continue to monitor.     GERD  Taking omeprazole 20 mg daily which manages symptoms well. He was switched from tablet to capsule and thinks he is more gassy with the capsule. He tried to switch back to the tablet, but this is not covered by insurance. He reports ongoing gas.     Colon cancer screening  Last colonoscopy was 8/5/2014--normal. No longer due because of age.     Diet and exercise    Wt Readings from Last 3 Encounters:   09/12/18 89.8 kg (198 lb)   09/05/17 92.5 kg (203 lb 14.4 oz)    11/10/16 94.3 kg (208 lb)     He continues to go to the [facility] 3-5x a week. Noted that patient has lost 5 lbs. He believes this may be due to stress with his wife's chemotherapy.     Reviewed and updated as needed this visit by clinical staff  Tobacco  Allergies  Meds  Med Hx  Surg Hx  Fam Hx  Soc Hx        Reviewed and updated as needed this visit by Provider        Social History   Substance Use Topics     Smoking status: Former Smoker     Packs/day: 0.50     Years: 20.00     Types: Cigarettes     Quit date: 9/22/1984     Smokeless tobacco: Never Used     Alcohol use 0.0 oz/week     0 Standard drinks or equivalent per week      Comment: About two drinks weekly     If you drink alcohol do you typically have >3 drinks per day or >7 drinks per week? No                        Today's PHQ-2 Score:   PHQ-2 ( 1999 [employer]) 9/12/2018 9/5/2017   Q1: Little interest or pleasure in doing things 0 0   Q2: Feeling down, depressed or hopeless 0 0   PHQ-2 Score 0 0   Q1: Little interest or pleasure in doing things Not at all -   Q2: Feeling down, depressed or hopeless Not at all -   PHQ-2 Score 0 -     Do you feel safe in your environment - Yes    Do you have a Health Care Directive?: Yes: Patient states has Advance Directive and will bring in a copy to clinic.    Current providers sharing in care for this patient include:   Patient Care Team:  Michael Ni MD as PCP - General    The following health maintenance items are reviewed in Epic and correct as of today:  Health Maintenance   Topic Date Due     INFLUENZA VACCINE (1) 09/01/2018     FALL RISK ASSESSMENT  09/05/2018     PHQ-2 Q1 YR  09/05/2018     MEDICARE ANNUAL WELLNESS VISIT  09/12/2019     ADVANCE DIRECTIVE PLANNING Q5 YRS  06/13/2021     LIPID MONITORING Q5 YEARS  09/05/2022     TETANUS Q10 YR  09/14/2022     COLONOSCOPY Q10 YR  08/05/2024     PNEUMOCOCCAL  Completed     ROS:  CONSTITUTIONAL: NEGATIVE for fever, chills, change in weight  INTEGUMENTARY/SKIN:  "NEGATIVE for worrisome rashes, moles or lesions  EYES: NEGATIVE for vision changes or irritation  ENT/MOUTH: NEGATIVE for ear, mouth and throat problems  RESP: NEGATIVE for significant cough or SOB  BREAST: NEGATIVE for masses, tenderness or discharge  CV: NEGATIVE for chest pain, palpitations or peripheral edema  GI: NEGATIVE for nausea, abdominal pain, heartburn, or change in bowel habits  : NEGATIVE for frequency, dysuria, or hematuria  MUSCULOSKELETAL: NEGATIVE for significant arthralgias or myalgia  NEURO: NEGATIVE for weakness, dizziness or paresthesias  ENDOCRINE: NEGATIVE for temperature intolerance, skin/hair changes  HEME: NEGATIVE for bleeding problems  PSYCHIATRIC: NEGATIVE for changes in mood or affect    This document serves as a record of the services and decisions personally performed and made by Michael Ni MD. It was created on his behalf by Nilsa Ricardo, a trained medical scribe. The creation of this document is based on the provider's statements to the medical scribe.  Nilsa Ricardo September 12, 2018 8:56 AM     OBJECTIVE:   /72 (BP Location: Right arm, Patient Position: Sitting, Cuff Size: Adult Large)  Pulse 84  Temp 98.2  F (36.8  C) (Oral)  Resp 16  Ht 6' 0.24\" (1.835 m)  Wt 198 lb (89.8 kg)  SpO2 95%  BMI 26.67 kg/m2 Estimated body mass index is 26.67 kg/(m^2) as calculated from the following:    Height as of this encounter: 6' 0.24\" (1.835 m).    Weight as of this encounter: 198 lb (89.8 kg).     EXAM:   GENERAL: healthy, alert and no distress  EYES: Eyes grossly normal to inspection, PERRL and conjunctivae and sclerae normal  HENT: ear canals and TM's normal--wax removed from both canals with curette. nose and mouth without ulcers or lesions  NECK: no adenopathy, no asymmetry, masses, or scars and thyroid normal to palpation  RESP: lungs clear to auscultation - no rales, rhonchi or wheezes  CV: regular rate and rhythm, normal S1 S2, no S3 or S4, no murmur, click or " rub, no peripheral edema and peripheral pulses strong  ABDOMEN: soft, nontender, no hepatosplenomegaly, no masses and bowel sounds normal   (male): normal male genitalia without lesions or urethral discharge, no hernia  RECTAL: normal sphincter tone, no rectal masses, prostate normal size, smooth, nontender without nodules or masses  MS: no gross musculoskeletal defects noted, no edema  SKIN: no suspicious lesions or rashes  NEURO: Normal strength and tone, mentation intact and speech normal  PSYCH: mentation appears normal, affect normal/bright    Diagnostic Test Results:  none     ASSESSMENT / PLAN:   (Z00.00) Routine general medical examination at a health care facility  (primary encounter diagnosis)  Comment: Stable health. See epic orders.  Plan: Comprehensive metabolic panel, Lipid panel         reflex to direct LDL Fasting, TSH with free T4         reflex, CBC with platelets          (R39.198) Slowing of urinary stream  Comment: Stable. Refilled rx, continue proscar  Plan: finasteride (PROSCAR) 5 MG tablet          (K21.9) Gastroesophageal reflux disease without esophagitis  Comment: stable with omeprazole. Refilled rx  Plan: omeprazole (PRILOSEC) 20 MG CR capsule          (R73.09) Elevated glucose  Comment: Checking A1c today  Plan: Hemoglobin A1c          (Z13.6) CARDIOVASCULAR SCREENING; LDL GOAL LESS THAN 160  Plan: Comprehensive metabolic panel, Lipid panel         reflex to direct LDL Fasting          (R97.20) Elevated prostate specific antigen (PSA)  Comment: Previous PSA minimally elevated. Updating today. Referral to urology provided in case interested, although patient expressed that he would prefer to monitor it instead  Plan: UROLOGY ADULT REFERRAL          (Z12.5) Special screening for malignant neoplasm of prostate  Plan: Prostate spec antigen screen          (N52.9) Erectile dysfunction, unspecified erectile dysfunction type  Comment: Written prescriptions provided for maximum dose of Viagra  "and Cialis, patient may fill one or both of these.  Plan: tadalafil (CIALIS) 20 MG tablet, sildenafil         (VIAGRA) 100 MG tablet          Everything looks fine!    Refills of medications have been faxed to your pharmacy. (Written prescriptions provided for maximum dose of either Viagra or Cialis).    I'll get back to you with lab results soon, especially if there is anything of concern.      See you in a year, sooner if problems.      End of Life Planning:  Patient currently has an advanced directive: Yes.  Practitioner is supportive of decision.    COUNSELING:  Reviewed preventive health counseling, as reflected in patient instructions       Regular exercise       Healthy diet/nutrition       Immunizations        Colon cancer screening       Prostate cancer screening    BP Readings from Last 1 Encounters:   09/12/18 130/72     Estimated body mass index is 26.67 kg/(m^2) as calculated from the following:    Height as of this encounter: 6' 0.24\" (1.835 m).    Weight as of this encounter: 198 lb (89.8 kg).  BP Screening:   Last 3 BP Readings:    BP Readings from Last 3 Encounters:   09/12/18 130/72   09/05/17 134/78   11/10/16 136/85     The following was recommended to the patient:  Re-screen BP within a year and recommended lifestyle modifications   reports that he quit smoking about 33 years ago. His smoking use included Cigarettes. He has a 10.00 pack-year smoking history. He has never used smokeless tobacco.    Appropriate preventive services were discussed with this patient, including applicable screening as appropriate for cardiovascular disease, diabetes, osteopenia/osteoporosis, and glaucoma.  As appropriate for age/gender, discussed screening for colorectal cancer, prostate cancer, breast cancer, and cervical cancer. Checklist reviewing preventive services available has been given to the patient.    Reviewed patients plan of care and provided an AVS. The Basic Care Plan (routine screening as documented " in Health Maintenance) for Jack meets the Care Plan requirement. This Care Plan has been established and reviewed with the Patient.    Counseling Resources:  ATP IV Guidelines  Pooled Cohorts Equation Calculator  Breast Cancer Risk Calculator  FRAX Risk Assessment  ICSI Preventive Guidelines  Dietary Guidelines for Americans, 2010  USDA's MyPlate  ASA Prophylaxis  Lung CA Screening    The information in this document, created by the medical scribe for me, accurately reflects the services I personally performed and the decisions made by me. I have reviewed and approved this document for accuracy prior to leaving the patient care area.  September 12, 2018 8:57 AM    Michael Ni MD  [facility]

## 2018-09-12 NOTE — MR AVS SNAPSHOT
After Visit Summary   9/12/2018    Jack Bro    MRN: [id number]           Patient Information     Date Of Birth          1940        Visit Information        Provider Department      9/12/2018 8:20 AM Michael Ni MD [facility]        Today's Diagnoses     Routine general medical examination at a health care facility    -  1    Slowing of urinary stream        Gastroesophageal reflux disease without esophagitis        Elevated glucose        CARDIOVASCULAR SCREENING; LDL GOAL LESS THAN 160        Elevated prostate specific antigen (PSA)        Special screening for malignant neoplasm of prostate        Erectile dysfunction, unspecified erectile dysfunction type          Care Instructions      Preventive Health Recommendations:       Male Ages 65 and over    Yearly exam:             See your health care provider every year in order to  o   Review health changes.   o   Discuss preventive care.    o   Review your medicines if your doctor has prescribed any.    Talk with your health care provider about whether you should have a test to screen for prostate cancer (PSA).    Every 3 years, have a diabetes test (fasting glucose). If you are at risk for diabetes, you should have this test more often.    Every 5 years, have a cholesterol test. Have this test more often if you are at risk for high cholesterol or heart disease.     Every 10 years, have a colonoscopy. Or, have a yearly FIT test (stool test). These exams will check for colon cancer.    Talk to with your health care provider about screening for Abdominal Aortic Aneurysm if you have a family history of AAA or have a history of smoking.  Shots:     Get a flu shot each year.     Get a tetanus shot every 10 years.     Talk to your doctor about your pneumonia vaccines. There are now two you should receive - Pneumovax (PPSV 23) and Prevnar (PCV 13).    Talk to your pharmacist about a shingles vaccine.     Talk to your doctor  about the hepatitis B vaccine.  Nutrition:     Eat at least 5 servings of fruits and vegetables each day.     Eat whole-grain bread, whole-wheat pasta and brown rice instead of white grains and rice.     Get adequate Calcium and Vitamin D.   Lifestyle    Exercise for at least 150 minutes a week (30 minutes a day, 5 days a week). This will help you control your weight and prevent disease.     Limit alcohol to one drink per day.     No smoking.     Wear sunscreen to prevent skin cancer.     See your dentist every six months for an exam and cleaning.     See your eye doctor every 1 to 2 years to screen for conditions such as glaucoma, macular degeneration and cataracts.      Everything looks fine!    Refills of medications have been faxed to your pharmacy. (Written prescriptions provided for maximum dose of either Viagra or Cialis).    I'll get back to you with lab results soon, especially if there is anything of concern.      See you in a year, sooner if problems.            Follow-ups after your visit        Additional Services     UROLOGY ADULT REFERRAL       Your provider has referred you to: [facility]: [facility] [phone]   [email or web address]    Please be aware that coverage of these services is subject to the terms and limitations of your health insurance plan.  Call member services at your health plan with any benefit or coverage questions.      Please bring the following with you to your appointment:    (1) Any X-Rays, CTs or MRIs which have been performed.  Contact the facility where they were done to arrange for  prior to your scheduled appointment.    (2) List of current medications  (3) This referral request   (4) Any documents/labs given to you for this referral                  Follow-up notes from your care team     Return in about 1 year (around 9/12/2019) for Physical Exam.      Who to contact     If you have questions or need follow up  "information about today's clinic visit or your schedule please contact [facility] directly at [phone].  Normal or non-critical lab and imaging results will be communicated to you by MyChart, letter or phone within 4 business days after the clinic has received the results. If you do not hear from us within 7 days, please contact the clinic through [facility] or phone. If you have a critical or abnormal lab result, we will notify you by phone as soon as possible.  Submit refill requests through [employer] or call your pharmacy and they will forward the refill request to us. Please allow 3 business days for your refill to be completed.          Additional Information About Your Visit        [facility] Information     [employer] gives you secure access to your electronic health record. If you see a primary care provider, you can also send messages to your care team and make appointments. If you have questions, please call your primary care clinic.  If you do not have a primary care provider, please call [phone] and they will assist you.        Care EveryWhere ID     This is your Care EveryWhere ID. This could be used by other organizations to access your [place] medical records  [id number]        Your Vitals Were     Pulse Temperature Respirations Height Pulse Oximetry BMI (Body Mass Index)    84 98.2  F (36.8  C) (Oral) 16 6' 0.24\" (1.835 m) 95% 26.67 kg/m2       Blood Pressure from Last 3 Encounters:   09/12/18 130/72   09/05/17 134/78   11/10/16 136/85    Weight from Last 3 Encounters:   09/12/18 198 lb (89.8 kg)   09/05/17 203 lb 14.4 oz (92.5 kg)   11/10/16 208 lb (94.3 kg)              We Performed the Following     CBC with platelets     Comprehensive metabolic panel     Hemoglobin A1c     Lipid panel reflex to direct LDL Fasting     Prostate spec antigen screen     TSH with free T4 reflex     UROLOGY ADULT REFERRAL          Today's Medication Changes          These changes are accurate as of " 9/12/18  9:20 AM.  If you have any questions, ask your nurse or doctor.               Start taking these medicines.        Dose/Directions    sildenafil 100 MG tablet   Commonly known as:  VIAGRA   Used for:  Erectile dysfunction, unspecified erectile dysfunction type   Started by:  Michael Ni MD        Dose:  100 mg   Take 1 tablet (100 mg) by mouth daily as needed 30 min to 4 hrs before sex.   Quantity:  6 tablet   Refills:  11       tadalafil 20 MG tablet   Commonly known as:  CIALIS   Used for:  Erectile dysfunction, unspecified erectile dysfunction type   Started by:  Michael Ni MD        Dose:  20 mg   Take 1 tablet (20 mg) by mouth daily as needed   Quantity:  6 tablet   Refills:  11         These medicines have changed or have updated prescriptions.        Dose/Directions    omeprazole 20 MG CR capsule   Commonly known as:  priLOSEC   This may have changed:  See the new instructions.   Used for:  Gastroesophageal reflux disease without esophagitis   Changed by:  Michael Ni MD        Dose:  20 mg   Take 1 capsule (20 mg) by mouth daily   Quantity:  90 capsule   Refills:  3            Where to get your medicines      These medications were sent to [facility] [place], [place] - [facility] [address]  [facility] [address] [place] [id number]     Phone:  [phone]     finasteride 5 MG tablet    omeprazole 20 MG CR capsule         Some of these will need a paper prescription and others can be bought over the counter.  Ask your nurse if you have questions.     Bring a paper prescription for each of these medications     sildenafil 100 MG tablet    tadalafil 20 MG tablet                Primary Care Provider Office Phone # Fax #    Michael Ni [phone]       [address] [place], [place] [id number]        Equal Access to Services     [facility] NEYMAR AH: Sheila Rodriguez, haley muller, fransico avendano  shivanijuanmagdalena leeLazaroaamonica ah. So [facility] [phone].    ATENCIÓN: Si aidan ortega, tiene a tolentino disposición servicios gratuitos de asistencia lingüística. Juan chase [phone].    We comply with applicable federal civil rights laws and [place] laws. We do not discriminate on the basis of race, color, national origin, age, disability, sex, sexual orientation, or gender identity.            Thank you!     Thank you for choosing [facility]  for your care. Our goal is always to provide you with excellent care. Hearing back from our patients is one way we can continue to improve our services. Please take a few minutes to complete the written survey that you may receive in the mail after your visit with us. Thank you!             Your Updated Medication List - Protect others around you: Learn how to safely use, store and throw away your medicines at www.disposemymeds.org.          This list is accurate as of 9/12/18  9:20 AM.  Always use your most recent med list.                   Brand Name Dispense Instructions for use Diagnosis    finasteride 5 MG tablet    PROSCAR    90 tablet    Take 1 tablet (5 mg) by mouth daily    Slowing of urinary stream       ibuprofen 200 MG tablet    ADVIL/MOTRIN     Take 200 mg by mouth at onset of headache. 3 at night        Multi-vitamin Tabs tablet   Generic drug:  multivitamin, therapeutic with minerals      1 tab daily        omeprazole 20 MG CR capsule    priLOSEC    90 capsule    Take 1 capsule (20 mg) by mouth daily    Gastroesophageal reflux disease without esophagitis       sildenafil 100 MG tablet    VIAGRA    6 tablet    Take 1 tablet (100 mg) by mouth daily as needed 30 min to 4 hrs before sex.    Erectile dysfunction, unspecified erectile dysfunction type       tadalafil 20 MG tablet    CIALIS    6 tablet    Take 1 tablet (20 mg) by mouth daily as needed    Erectile dysfunction, unspecified erectile dysfunction type

## 2018-09-13 LAB
ALBUMIN SERPL-MCNC: 4.1 G/DL (ref 3.4–5)
ALP SERPL-CCNC: 52 U/L (ref 40–150)
ALT SERPL W P-5'-P-CCNC: 23 U/L (ref 0–70)
ANION GAP SERPL CALCULATED.3IONS-SCNC: 6 MMOL/L (ref 3–14)
AST SERPL W P-5'-P-CCNC: 21 U/L (ref 0–45)
BILIRUB SERPL-MCNC: 0.6 MG/DL (ref 0.2–1.3)
BUN SERPL-MCNC: 15 MG/DL (ref 7–30)
CALCIUM SERPL-MCNC: 9 MG/DL (ref 8.5–10.1)
CHLORIDE SERPL-SCNC: 106 MMOL/L (ref 94–109)
CHOLEST SERPL-MCNC: 208 MG/DL
CO2 SERPL-SCNC: 28 MMOL/L (ref 20–32)
CREAT SERPL-MCNC: 0.92 MG/DL (ref 0.66–1.25)
GFR SERPL CREATININE-BSD FRML MDRD: 80 ML/MIN/1.7M2
GLUCOSE SERPL-MCNC: 101 MG/DL (ref 70–99)
HDLC SERPL-MCNC: 61 MG/DL
LDLC SERPL CALC-MCNC: 130 MG/DL
NONHDLC SERPL-MCNC: 147 MG/DL
POTASSIUM SERPL-SCNC: 4.5 MMOL/L (ref 3.4–5.3)
PROT SERPL-MCNC: 7.1 G/DL (ref 6.8–8.8)
PSA SERPL-ACNC: 4.45 UG/L (ref 0–4)
SODIUM SERPL-SCNC: 140 MMOL/L (ref 133–144)
TRIGL SERPL-MCNC: 85 MG/DL
TSH SERPL DL<=0.005 MIU/L-ACNC: 1.66 MU/L (ref 0.4–4)

## 2018-11-18 ENCOUNTER — APPOINTMENT (OUTPATIENT)
Dept: CT IMAGING | Facility: CLINIC | Age: 78
End: 2018-11-18
Attending: EMERGENCY MEDICINE
Payer: MEDICARE

## 2018-11-18 ENCOUNTER — HOSPITAL ENCOUNTER (OUTPATIENT)
Facility: CLINIC | Age: 78
Setting detail: OBSERVATION
Discharge: HOME | End: 2018-11-19
Attending: EMERGENCY MEDICINE | Admitting: INTERNAL MEDICINE
Payer: MEDICARE

## 2018-11-18 DIAGNOSIS — K40.30 INGUINAL HERNIA, INCARCERATED: ICD-10-CM

## 2018-11-18 DIAGNOSIS — K56.609 SBO (SMALL BOWEL OBSTRUCTION) (H): ICD-10-CM

## 2018-11-18 LAB
ALBUMIN UR-MCNC: NEGATIVE MG/DL
ANION GAP SERPL CALCULATED.3IONS-SCNC: 8 MMOL/L (ref 3–14)
APPEARANCE UR: ABNORMAL
BASOPHILS # BLD AUTO: 0 10E9/L (ref 0–0.2)
BASOPHILS NFR BLD AUTO: 0.3 %
BILIRUB UR QL STRIP: NEGATIVE
BUN SERPL-MCNC: 21 MG/DL (ref 7–30)
CALCIUM SERPL-MCNC: 9.4 MG/DL (ref 8.5–10.1)
CHLORIDE SERPL-SCNC: 101 MMOL/L (ref 94–109)
CO2 SERPL-SCNC: 27 MMOL/L (ref 20–32)
COLOR UR AUTO: YELLOW
CREAT BLD-MCNC: 1.1 MG/DL (ref 0.66–1.25)
CREAT SERPL-MCNC: 1.01 MG/DL (ref 0.66–1.25)
DIFFERENTIAL METHOD BLD: NORMAL
EOSINOPHIL # BLD AUTO: 0 10E9/L (ref 0–0.7)
EOSINOPHIL NFR BLD AUTO: 0.1 %
ERYTHROCYTE [DISTWIDTH] IN BLOOD BY AUTOMATED COUNT: 13 % (ref 10–15)
GFR SERPL CREATININE-BSD FRML MDRD: 65 ML/MIN/1.7M2
GFR SERPL CREATININE-BSD FRML MDRD: 72 ML/MIN/1.7M2
GLUCOSE SERPL-MCNC: 163 MG/DL (ref 70–99)
GLUCOSE UR STRIP-MCNC: NEGATIVE MG/DL
HCT VFR BLD AUTO: 43.4 % (ref 40–53)
HGB BLD-MCNC: 14 G/DL (ref 13.3–17.7)
HGB UR QL STRIP: ABNORMAL
IMM GRANULOCYTES # BLD: 0 10E9/L (ref 0–0.4)
IMM GRANULOCYTES NFR BLD: 0.2 %
KETONES UR STRIP-MCNC: NEGATIVE MG/DL
LACTATE BLD-SCNC: 2.5 MMOL/L (ref 0.7–2)
LACTATE SERPL-SCNC: 1 MMOL/L (ref 0.4–2)
LEUKOCYTE ESTERASE UR QL STRIP: ABNORMAL
LYMPHOCYTES # BLD AUTO: 1.2 10E9/L (ref 0.8–5.3)
LYMPHOCYTES NFR BLD AUTO: 11.9 %
MCH RBC QN AUTO: 28.4 PG (ref 26.5–33)
MCHC RBC AUTO-ENTMCNC: 32.3 G/DL (ref 31.5–36.5)
MCV RBC AUTO: 88 FL (ref 78–100)
MONOCYTES # BLD AUTO: 0.6 10E9/L (ref 0–1.3)
MONOCYTES NFR BLD AUTO: 6.1 %
MUCOUS THREADS #/AREA URNS LPF: PRESENT /LPF
NEUTROPHILS # BLD AUTO: 7.9 10E9/L (ref 1.6–8.3)
NEUTROPHILS NFR BLD AUTO: 81.4 %
NITRATE UR QL: NEGATIVE
NRBC # BLD AUTO: 0 10*3/UL
NRBC BLD AUTO-RTO: 0 /100
PH UR STRIP: 7 PH (ref 5–7)
PLATELET # BLD AUTO: 181 10E9/L (ref 150–450)
POTASSIUM SERPL-SCNC: 3.6 MMOL/L (ref 3.4–5.3)
RBC # BLD AUTO: 4.93 10E12/L (ref 4.4–5.9)
RBC #/AREA URNS AUTO: 29 /HPF (ref 0–2)
SODIUM SERPL-SCNC: 136 MMOL/L (ref 133–144)
SOURCE: ABNORMAL
SP GR UR STRIP: 1.02 (ref 1–1.03)
SQUAMOUS #/AREA URNS AUTO: 29 /HPF (ref 0–1)
UROBILINOGEN UR STRIP-MCNC: 0 MG/DL (ref 0–2)
WBC # BLD AUTO: 9.7 10E9/L (ref 4–11)
WBC #/AREA URNS AUTO: 4 /HPF (ref 0–5)

## 2018-11-18 PROCEDURE — 40000275 ZZH STATISTIC RCP TIME EA 10 MIN

## 2018-11-18 PROCEDURE — 96376 TX/PRO/DX INJ SAME DRUG ADON: CPT

## 2018-11-18 PROCEDURE — 96374 THER/PROPH/DIAG INJ IV PUSH: CPT | Mod: 59

## 2018-11-18 PROCEDURE — 72192 CT PELVIS W/O DYE: CPT | Mod: XS

## 2018-11-18 PROCEDURE — 82565 ASSAY OF CREATININE: CPT | Mod: 91

## 2018-11-18 PROCEDURE — 83605 ASSAY OF LACTIC ACID: CPT | Performed by: EMERGENCY MEDICINE

## 2018-11-18 PROCEDURE — 99285 EMERGENCY DEPT VISIT HI MDM: CPT | Mod: 25

## 2018-11-18 PROCEDURE — 80048 BASIC METABOLIC PNL TOTAL CA: CPT | Performed by: EMERGENCY MEDICINE

## 2018-11-18 PROCEDURE — 25000132 ZZH RX MED GY IP 250 OP 250 PS 637: Mod: GY | Performed by: EMERGENCY MEDICINE

## 2018-11-18 PROCEDURE — 81001 URINALYSIS AUTO W/SCOPE: CPT | Performed by: EMERGENCY MEDICINE

## 2018-11-18 PROCEDURE — 85025 COMPLETE CBC W/AUTO DIFF WBC: CPT | Performed by: EMERGENCY MEDICINE

## 2018-11-18 PROCEDURE — 74177 CT ABD & PELVIS W/CONTRAST: CPT

## 2018-11-18 PROCEDURE — 25000128 H RX IP 250 OP 636: Performed by: EMERGENCY MEDICINE

## 2018-11-18 PROCEDURE — A9270 NON-COVERED ITEM OR SERVICE: HCPCS | Mod: GY | Performed by: EMERGENCY MEDICINE

## 2018-11-18 PROCEDURE — 96375 TX/PRO/DX INJ NEW DRUG ADDON: CPT

## 2018-11-18 PROCEDURE — 96361 HYDRATE IV INFUSION ADD-ON: CPT

## 2018-11-18 RX ORDER — HYDROMORPHONE HYDROCHLORIDE 1 MG/ML
0.5 INJECTION, SOLUTION INTRAMUSCULAR; INTRAVENOUS; SUBCUTANEOUS
Status: DISCONTINUED | OUTPATIENT
Start: 2018-11-18 | End: 2018-11-19

## 2018-11-18 RX ORDER — ACETAMINOPHEN 500 MG
1000 TABLET ORAL ONCE
Status: COMPLETED | OUTPATIENT
Start: 2018-11-18 | End: 2018-11-18

## 2018-11-18 RX ORDER — ONDANSETRON 2 MG/ML
4 INJECTION INTRAMUSCULAR; INTRAVENOUS ONCE
Status: COMPLETED | OUTPATIENT
Start: 2018-11-18 | End: 2018-11-18

## 2018-11-18 RX ORDER — IOPAMIDOL 755 MG/ML
500 INJECTION, SOLUTION INTRAVASCULAR ONCE
Status: COMPLETED | OUTPATIENT
Start: 2018-11-18 | End: 2018-11-18

## 2018-11-18 RX ORDER — HYDROMORPHONE HYDROCHLORIDE 1 MG/ML
0.5 INJECTION, SOLUTION INTRAMUSCULAR; INTRAVENOUS; SUBCUTANEOUS ONCE
Status: COMPLETED | OUTPATIENT
Start: 2018-11-18 | End: 2018-11-18

## 2018-11-18 RX ORDER — PROPOFOL 10 MG/ML
INJECTION, EMULSION INTRAVENOUS
Status: DISCONTINUED
Start: 2018-11-18 | End: 2018-11-19 | Stop reason: HOSPADM

## 2018-11-18 RX ORDER — PROPOFOL 10 MG/ML
200 INJECTION, EMULSION INTRAVENOUS ONCE
Status: DISCONTINUED | OUTPATIENT
Start: 2018-11-18 | End: 2018-11-19

## 2018-11-18 RX ADMIN — ONDANSETRON 4 MG: 2 INJECTION INTRAMUSCULAR; INTRAVENOUS at 23:36

## 2018-11-18 RX ADMIN — SODIUM CHLORIDE, POTASSIUM CHLORIDE, SODIUM LACTATE AND CALCIUM CHLORIDE 1000 ML: 600; 310; 30; 20 INJECTION, SOLUTION INTRAVENOUS at 20:44

## 2018-11-18 RX ADMIN — SODIUM CHLORIDE 1000 ML: 9 INJECTION, SOLUTION INTRAVENOUS at 22:23

## 2018-11-18 RX ADMIN — ACETAMINOPHEN 1000 MG: 500 TABLET, FILM COATED ORAL at 20:44

## 2018-11-18 RX ADMIN — Medication 0.5 MG: at 23:36

## 2018-11-18 RX ADMIN — Medication 0.5 MG: at 21:22

## 2018-11-18 RX ADMIN — Medication 0.5 MG: at 20:34

## 2018-11-18 RX ADMIN — IOPAMIDOL 98 ML: 755 INJECTION, SOLUTION INTRAVENOUS at 20:52

## 2018-11-18 RX ADMIN — SODIUM CHLORIDE 79 ML: 9 INJECTION, SOLUTION INTRAVENOUS at 20:52

## 2018-11-18 ASSESSMENT — ENCOUNTER SYMPTOMS
DYSURIA: 0
VOMITING: 1
ABDOMINAL PAIN: 1
HEMATURIA: 0

## 2018-11-18 NOTE — IP AVS SNAPSHOT
[place] [address] [facility]    [address]    [facility] [address]    Phone:  [phone]    Fax:  [phone]                                       After Visit Summary   11/18/2018    Jack Bro    MRN: [id number]           After Visit Summary Signature Page     I have received my discharge instructions, and my questions have been answered. I have discussed any challenges I see with this plan with the nurse or doctor.    ..........................................................................................................................................  Patient/Patient Representative Signature      ..........................................................................................................................................  Patient Representative Print Name and Relationship to Patient    ..................................................               ................................................  Date                                   Time    ..........................................................................................................................................  Reviewed by Signature/Title    ...................................................              ..............................................  Date                                               Time          22EPIC Rev 08/18

## 2018-11-18 NOTE — ED AVS SNAPSHOT
[facility]    [address]    [facility] [address]    Phone:  [phone]    Fax:  [phone]                                       Jack Bro   MRN: [id number]    Department:  [facility]   Date of Visit:  11/18/2018           After Visit Summary Signature Page     I have received my discharge instructions, and my questions have been answered. I have discussed any challenges I see with this plan with the nurse or doctor.    ..........................................................................................................................................  Patient/Patient Representative Signature      ..........................................................................................................................................  Patient Representative Print Name and Relationship to Patient    ..................................................               ................................................  Date                                   Time    ..........................................................................................................................................  Reviewed by Signature/Title    ...................................................              ..............................................  Date                                               Time          22EPIC Rev 08/18

## 2018-11-18 NOTE — IP AVS SNAPSHOT
MRN:[id number]                      After Visit Summary   11/18/2018    Jack Bro    MRN: [id number]           Thank you!     Thank you for choosing [facility] for your care. Our goal is always to provide you with excellent care. Hearing back from our patients is one way we can continue to improve our services. Please take a few minutes to complete the written survey that you may receive in the mail after you visit. If you would like to speak to someone directly about your visit please contact Patient Relations at [phone]. Thank you!          Patient Information     Date Of Birth          1940        About your hospital stay     You were admitted on:  November 19, 2018 You last received care in the:  [facility] [facility] [address] [facility]    You were discharged on:  November 19, 2018        Reason for your hospital stay       Bowel obstruction due to hernia                  Who to Call     For medical emergencies, please call 911.  For non-urgent questions about your medical care, please call your primary care provider or clinic, [phone]          Attending Provider     Provider Specialty    Ryan Bolaños MD Emergency Medicine    Diya Bolton MD Internal Medicine       Primary Care Provider Office Phone # Fax #    Michael Ni [phone]      After Care Instructions     Diet       Follow this diet upon discharge: Orders Placed This Encounter      Advance Diet as Tolerated: Regular Diet Adult                  Follow-up Appointments     Follow-up and recommended labs and tests        Follow up with the surgeon regarding hernia surgery in 2 weeks                  Your next 10 appointments already scheduled     Nov 21, 2018 11:00 AM CST   [facility]    [address]  [facility] [id number] [phone]              Further instructions  from your care team       Please make an appointment to follow up with your primary care provider in 3-5 days if not improving.    Return to ER immediately if you develop: worsening symptoms, Fever > 101, persistent nausea or vomiting OR you have any other concerns about your health.    Please make an appointment to follow up with [facility] [phone] to discuss hernia repair options        Small Bowel Obstruction     Small bowel obstruction can lead to tissue damage and even tissue death.   A small bowel obstruction occurs when part or all of the small intestine (bowel) is blocked. As a result, digestive contents can t move through the bowel properly and out of the body. Treatment is needed right away to remove the blockage. This can ease painful symptoms. It can also prevent serious problems, such as tissue death or bursting (rupture) of the small bowel. Without treatment, a small bowel obstruction can be fatal.  Causes of small bowel obstruction  A small bowel obstruction can be caused by:    Scar tissue (adhesions). These may form after belly (abdominal) surgery or an infection.    Hernia. A hernia is when an organ pushes through a weak spot or tear in the abdomen wall. Part of the small bowel can push out and be seen as a bulge under the belly. Hernias can also occur internally.    Certain health problems. These include when part of the bowel slides inside another part (intussusception). Other causes include irritable bowel disease such as Crohn s disease, and inflammation and sores in the intestine (ulcerative colitis).    Abnormal tissue growths (tumors). These can form on the inside or outside of the small bowel. They are usually due to cancer.  Symptoms of small bowel obstruction  Common symptoms include:    Belly cramping and pain    Belly swelling and bloating    Upset stomach (nausea) and vomiting    Can't  pass gas    Can't pass stool (constipation)    Diarrhea  Diagnosing small bowel  obstruction  Your provider will ask about your symptoms and health history. You ll also have a physical exam. Tests may also be done to confirm the problem. These can include:    Imaging tests. These provide pictures of the small bowel. Common tests include X-rays and a CT scan.    Blood tests. These check for infection and other problems, such as excess fluid loss (dehydration).    Upper GI (gastrointestinal) series with a small bowel follow-through. This test takes X-rays of the upper digestive tract from the mouth through the small bowel. An X-ray dye (contrast fluid) is used. The dye coats the inside of your upper digestive tract so it will show up clearly on X-rays.  Treating small bowel obstruction  Treatment takes place in a hospital. As part of your care, the following may be done:    No food or drink is given by mouth. This allows your bowels to rest.    An IV (intravenous) line is placed in a vein in your arm or hand. The IV line is used to give fluids. It may also be used to give medicines. These may be needed to ease pain, nausea, and other symptoms. They may also be needed to treat or prevent infections.    A soft, thin, flexible tube (nasogastric tube) is inserted through your nose and into your stomach. The tube is used to remove extra gas and fluid in your stomach and bowels. This helps to ease symptoms such as pain and swelling.    In severe cases, surgery is done. This may be needed if the small bowel is almost or totally blocked, or there is a hole in the bowel (bowel perforation). During surgery, the blockage is removed. Parts of the bowel may also be removed if there is tissue death. Other repair may be done as well, depending on what caused the blockage. Your healthcare provider will give you more information about surgery, if needed.    You ll be watched closely in the hospital until your symptoms improve. Your provider will tell you when you can go home.  Long-term concerns   After treatment,  most people recover with no lasting effects. If a long part of the bowel is removed, there is a greater chance for lifelong digestive problems. Bowel movements may become irregular. Work with your provider to learn the best ways to manage any symptoms you may have, and to protect your health.  When to call your healthcare provider  Call your provider right away if you have any of the following:    Severe pain (call 911)    Belly swelling or cramping that won t go away    Can t pass stool or gas    Nausea or vomiting (especially if the vomit looks or smells like stool)   Date Last Reviewed: 7/1/2016 2000-2018 [employer]. [address] [place] [address]. All rights reserved. This information is not intended as a substitute for professional medical care. Always follow your healthcare professional's instructions.          Hernia (Adult)    A hernia can happen when there is a weakness or defect in the wall of the abdomen or groin. Intestines or nearby tissues may move from their usual location and push through the weakness in the wall. This can cause a hernia (bulge) you may see or feel.  Causes and risk factors   A hernia may be present at birth. Or it may be caused by the wear and tear of daily living. Certain factors can make a hernia more likely. These can include:    Heavy lifting    Straining, whether from lifting, movement, or constipation    Chronic cough    Injury to the abdominal wall    Excess weight    Pregnancy    Prior surgery    Older age    Family history of hernia  Symptoms  Symptoms of a hernia may come on suddenly. Or they may appear slowly over time. Some common symptoms include:    Bulge in the groin area, around the navel, or in the scrotum (the bulge may get bigger when you stand and go away when you lie down)    Pain or pressure around the bulge    Pain during activities such as lifting, coughing, or sneezing    A feeling of weakness or pressure in the groin    Pain or  swelling in the scrotum  Types of hernias  There are different types of hernia. The type you have depends on its location:    Inguinal. This type is in the groin or scrotum. It is more common in men. But, women can get this hernia, too.    Femoral. This type is in the groin, upper thigh (where the leg bends), or labia. It is more common in women.    Ventral. This type is in the abdominal wall.    Umbilical. This type occurs around the navel (belly button).    Incisional. This type occurs at the site of a previous surgery.  The condition of the hernia can help determine how urgently it needs to be treated.    Reducible. It goes back in by itself, or it can be pushed back in.    Irreducible. It can t be pushed back in.    Incarcerated/strangulated. The intestine is trapped (incarcerated). If this happens, you won t be able to push the bulge back in. If the incarcerated hernia isn t treated, it may become strangulated. This means the area loses blood supply and the tissue may die. This requires emergency surgery. You need treatment right away.  In most cases, a hernia will not heal on its own.You may need surgery to repair the defect in the abdominal wall or groin. You ll be told more about surgery, if needed.  If your symptoms are not severe, treatment may sometimes be delayed. In such cases, you will need regular follow-up visits with the provider. You ll be asked to keep track of your symptoms and to watch for signs of more serious problems. You may also be given guidelines similar to the home care instructions below.  Home care  To help keep a hernia from getting worse, you may be advised to:    Avoid heavy lifting and straining as directed.    Take steps to prevent constipation, such as eating more fiber and drinking more water. This may help reduce straining that can occur when having a bowel movement. Reducing straining may help keep your symptoms from getting worse.    Maintain a healthy weight or lose excess  weight. This can help reduce strain on abdominal muscles and tissues.    Stop smoking. This can help prevent coughing that may also strain abdominal muscles and tissues.  Follow-up care  Follow up with your healthcare provider, or as directed. If imaging tests were done, they will be reviewed a doctor. You will be told the results and any new findings that may affect your care.  When to seek medical advice  Call your healthcare provider right away if any of these occur:    Hernia hardens, swells, or grows larger    Hernia can no longer be pushed back in    Pain moves to the lower right abdomen (just below the waistline), or spreads to the back  Call 911  Call 911 if any of these occur:    Severe pain, redness, or tenderness in the area near the hernia    Pain worsens quickly and doesn t get better    Inability to have a bowel movement or pass gas    Fever of 100.4 F (38 C) or higher, or as directed by your healthcare provider  Date Last Reviewed: 3/1/2018    7378-7001 [employer]. [address] [place] [address]. All rights reserved. This information is not intended as a substitute for professional medical care. Always follow your healthcare professional's instructions.        Procedural Sedation  Procedural sedation is medicine to ease discomfort, pain, and anxiety during a procedure. The medicine is often given through an IV (intravenous) line in your arm or hand. In some cases, the medicine may be taken by mouth or inhaled. While you are under sedation, you will likely be awake. But you may not remember anything afterward.  Why procedural sedation is used  Sedation is used for many types of procedures. The goal is to reduce pain, anxiety, and stressful memories of a procedure. It can also help your healthcare provider treat you. For example, having a broken bone fixed may be easier if you feel relaxed.  Procedural sedation is used only for short, basic procedures. It is not used for  complex surgeries. Some procedures that use this type of sedation include:    Dental surgery    Breast biopsy, to take a sample of breast tissue    Endoscopy, to look at gastrointestinal problems    Bronchoscopy, to check for lung problems    Bone or joint realignment, to fix a broken bone or dislocated joint    Minor foot or skin surgery    Electrical cardioversion, to restore a normal heart rhythm    Lumbar puncture, to assess neurological disease  Risks of procedural sedation  Procedural sedation has some risks and possible side effects, such as:    Headache    Nausea and vomiting    Unpleasant memory of the procedure    Lowered rate of breathing    Changes in heart rate and blood pressure (rare)    Inhalation of stomach contents into your lungs (rare)  Side effects will likely go away shortly after the procedure. Your healthcare team will watch your heart rate and breathing during your sedation. This is to help prevent problems.  Your own risks may vary based on your age and your overall health. They also depend on the type of sedation you are given. Talk with your healthcare provider about the risks that apply most to you.  Getting ready for procedural sedation  Talk with your healthcare provider about how to get ready for your procedure. Tell him or her about all the medicines you take. This includes over-the-counter medicines such as ibuprofen. It also includes vitamins, herbs, and other supplements. You may need to stop taking some medicines before the procedure, such as blood thinners and aspirin. If you smoke, you should stop to lessen the chance of a lung issue. Talk with your healthcare provider if you need help to stop smoking.  Tell your healthcare provider if you:    Have had any problems in the past with sedation or anesthesia    Have had any recent changes in your health, such as an infection or fever    Are pregnant or think you may be pregnant  Also be sure to:    Ask a family member or friend to  take you home after the procedure. You can t drive on the day you receive sedation.    Not eat or drink after midnight the night before your procedure, if advised.    Not plan on making any important decisions, such as financial or legal, for the day after you receive the sedation.    Follow all other instructions from your healthcare provider.  During your procedural sedation  You may have your procedure in a hospital or a medical clinic. Sedation is done by a trained healthcare provider. In general, you can expect the following:    You will be given medicine through an IV line in your arm or hand. Or you may receive a shot. The medicine may also be given by mouth. Or you may inhale it through a mask.    If you receive medicine through an IV, you may feel the effects very quickly. You will start to feel relaxed and drowsy.    During the procedure, your heart rate, breathing, and blood pressure will be closely watched. Your breathing and blood pressure may decrease a little. But you will likely not need help with your breathing. You may receive a little extra oxygen through a mask or through some soft plastic prongs underneath your nose.    You will probably be awake the entire time. If you do fall asleep, you should be easy to wake up, if needed. You should feel little or no pain.    When your procedure is over, the sedative medicine will be stopped.  After your procedural sedation  You will begin to feel more awake and aware. But you will likely be drowsy for a while afterward. You will be closely watched as you become more alert. You may have a faint memory of the procedure. Or you may not remember it at all.  You should be able to return home within an hour or two after your procedure. Plan to have someone stay with you for a few hours. Side effects such as headache and nausea may go away quickly. Tell your healthcare provider if they continue.  Don t drive or make any important decisions for at least 24 hours.  "Be sure to follow all after-care instructions.      When to call your healthcare provider  Have someone call your healthcare provider right away if you have any of these:    Drowsiness that gets worse    Weakness or dizziness that gets worse    Repeated vomiting    You can t be awakened    Severe or ongoing pain from the procedure, not relieved by the pain medicine   Date Last Reviewed: 2/1/2017 2000-2018 [employer]. [address] [place] [address]. All rights reserved. This information is not intended as a substitute for professional medical care. Always follow your healthcare professional's instructions.             Pending Results     No orders found for last 3 day(s).            Statement of Approval     Ordered          11/19/18 1650  I have reviewed and agree with all the recommendations and orders detailed in this document.  EFFECTIVE NOW     Approved and electronically signed by:  Pepito Patel MD             Admission Information     Date & Time Provider Department Dept. Phone    11/18/2018 Diya Bolton MD [place] [address] [facility] [phone]      Your Vitals Were     Blood Pressure Pulse Temperature Respirations Height Weight    134/72 (BP Location: Right arm) 86 97.9  F (36.6  C) (Axillary) 16 1.854 m (6' 1\") 89.8 kg (198 lb)    Pulse Oximetry BMI (Body Mass Index)                94% 26.12 kg/m2          MyChart Information     [employer] gives you secure access to your electronic health record. If you see a primary care provider, you can also send messages to your care team and make appointments. If you have questions, please call your primary care clinic.  If you do not have a primary care provider, please call [phone] and they will assist you.        Care EveryWhere ID     This is your Care EveryWhere ID. This could be used by other organizations to access your [facility] medical records  [id number]        Equal Access to Services     NASH BLACKMON AH: " Hadii luis m arellano hadtresao Soomaali, waaxda luqadaha, qaybta kaalmada aderon, fransico yeisonin hayaan angelina macedomonica paige. So [facility] [phone].    ATENCIÓN: Si habla jordan, tiene a tolentino disposición servicios gratuitos de asistencia lingüística. Llame al [phone].    We comply with applicable federal civil rights laws and [place] laws. We do not discriminate on the basis of race, color, national origin, age, disability, sex, sexual orientation, or gender identity.               Review of your medicines      START taking        Dose / Directions    ondansetron 4 MG ODT tab   Commonly known as:  ZOFRAN ODT   Used for:  Inguinal hernia, incarcerated, SBO (small bowel obstruction) (H)        Dose:  4 mg   Take 1 tablet (4 mg) by mouth every 8 hours as needed for nausea or vomiting   Quantity:  6 tablet   Refills:  0         CONTINUE these medicines which have NOT CHANGED        Dose / Directions    aspirin 81 MG tablet        Dose:  81 mg   Take 81 mg by mouth daily   Refills:  0       finasteride 5 MG tablet   Commonly known as:  PROSCAR   Used for:  Slowing of urinary stream        Dose:  1 tablet   Take 1 tablet (5 mg) by mouth daily   Quantity:  90 tablet   Refills:  3       ibuprofen 200 MG tablet   Commonly known as:  ADVIL/MOTRIN        Dose:  400-600 mg   Take 400-600 mg by mouth every 8 hours as needed 3 at night   Refills:  0       Multi-vitamin Tabs tablet   Generic drug:  multivitamin, therapeutic with minerals        1 tab daily   Refills:  0       omeprazole 20 MG CR capsule   Commonly known as:  priLOSEC   Used for:  Gastroesophageal reflux disease without esophagitis        Dose:  20 mg   Take 1 capsule (20 mg) by mouth daily   Quantity:  90 capsule   Refills:  3       sildenafil 100 MG tablet   Commonly known as:  VIAGRA   Used for:  Erectile dysfunction, unspecified erectile dysfunction type        Dose:  100 mg   Take 1 tablet (100 mg) by mouth daily as needed 30 min to 4 hrs before sex.   Quantity:   6 tablet   Refills:  11            Where to get your medicines      These medications were sent to [facility] [place], [place] - [address] [facility]  [address] [facility] [address]     Phone:  [phone]     ondansetron 4 MG ODT tab                Protect others around you: Learn how to safely use, store and throw away your medicines at www.disposemymeds.org.             Medication List: This is a list of all your medications and when to take them. Check marks below indicate your daily home schedule. Keep this list as a reference.      Medications           Morning Afternoon Evening Bedtime As Needed    aspirin 81 MG tablet   Take 81 mg by mouth daily                                   finasteride 5 MG tablet   Commonly known as:  PROSCAR   Take 1 tablet (5 mg) by mouth daily   Last time this was given:  5 mg on 11/19/2018  9:34 AM                                   ibuprofen 200 MG tablet   Commonly known as:  ADVIL/MOTRIN   Take 400-600 mg by mouth every 8 hours as needed 3 at night                                   Multi-vitamin Tabs tablet   1 tab daily   Generic drug:  multivitamin, therapeutic with minerals                                   omeprazole 20 MG CR capsule   Commonly known as:  priLOSEC   Take 1 capsule (20 mg) by mouth daily   Last time this was given:  20 mg on 11/19/2018  9:34 AM                                   ondansetron 4 MG ODT tab   Commonly known as:  ZOFRAN ODT   Take 1 tablet (4 mg) by mouth every 8 hours as needed for nausea or vomiting   Last time this was given:  4 mg on 11/19/2018  2:39 AM                                sildenafil 100 MG tablet   Commonly known as:  VIAGRA   Take 1 tablet (100 mg) by mouth daily as needed 30 min to 4 hrs before sex.

## 2018-11-18 NOTE — ED AVS SNAPSHOT
[facility] Emergency Department    [address]    [facility] [address]    Phone:  [phone]    Fax:  [phone]                                       Jack Bro   MRN: [id number]    Department:  [facility] Emergency Department   Date of Visit:  11/18/2018           Patient Information     Date Of Birth          1940        Your diagnoses for this visit were:     Inguinal hernia, incarcerated     SBO (small bowel obstruction) (H)        You were seen by Ryan Bolaños MD.        Discharge Instructions       Please make an appointment to follow up with your primary care provider in 3-5 days if not improving.    Return to ER immediately if you develop: worsening symptoms, Fever > 101, persistent nausea or vomiting OR you have any other concerns about your health.    Please make an appointment to follow up with [facility] [phone] to discuss hernia repair options        Small Bowel Obstruction     Small bowel obstruction can lead to tissue damage and even tissue death.   A small bowel obstruction occurs when part or all of the small intestine (bowel) is blocked. As a result, digestive contents can t move through the bowel properly and out of the body. Treatment is needed right away to remove the blockage. This can ease painful symptoms. It can also prevent serious problems, such as tissue death or bursting (rupture) of the small bowel. Without treatment, a small bowel obstruction can be fatal.  Causes of small bowel obstruction  A small bowel obstruction can be caused by:    Scar tissue (adhesions). These may form after belly (abdominal) surgery or an infection.    Hernia. A hernia is when an organ pushes through a weak spot or tear in the abdomen wall. Part of the small bowel can push out and be seen as a bulge under the belly. Hernias can also occur internally.    Certain health problems. These include when part of the bowel slides inside  another part (intussusception). Other causes include irritable bowel disease such as Crohn s disease, and inflammation and sores in the intestine (ulcerative colitis).    Abnormal tissue growths (tumors). These can form on the inside or outside of the small bowel. They are usually due to cancer.  Symptoms of small bowel obstruction  Common symptoms include:    Belly cramping and pain    Belly swelling and bloating    Upset stomach (nausea) and vomiting    Can't  pass gas    Can't pass stool (constipation)    Diarrhea  Diagnosing small bowel obstruction  Your provider will ask about your symptoms and health history. You ll also have a physical exam. Tests may also be done to confirm the problem. These can include:    Imaging tests. These provide pictures of the small bowel. Common tests include X-rays and a CT scan.    Blood tests. These check for infection and other problems, such as excess fluid loss (dehydration).    Upper GI (gastrointestinal) series with a small bowel follow-through. This test takes X-rays of the upper digestive tract from the mouth through the small bowel. An X-ray dye (contrast fluid) is used. The dye coats the inside of your upper digestive tract so it will show up clearly on X-rays.  Treating small bowel obstruction  Treatment takes place in a hospital. As part of your care, the following may be done:    No food or drink is given by mouth. This allows your bowels to rest.    An IV (intravenous) line is placed in a vein in your arm or hand. The IV line is used to give fluids. It may also be used to give medicines. These may be needed to ease pain, nausea, and other symptoms. They may also be needed to treat or prevent infections.    A soft, thin, flexible tube (nasogastric tube) is inserted through your nose and into your stomach. The tube is used to remove extra gas and fluid in your stomach and bowels. This helps to ease symptoms such as pain and swelling.    In severe cases, surgery is  done. This may be needed if the small bowel is almost or totally blocked, or there is a hole in the bowel (bowel perforation). During surgery, the blockage is removed. Parts of the bowel may also be removed if there is tissue death. Other repair may be done as well, depending on what caused the blockage. Your healthcare provider will give you more information about surgery, if needed.    You ll be watched closely in the hospital until your symptoms improve. Your provider will tell you when you can go home.  Long-term concerns   After treatment, most people recover with no lasting effects. If a long part of the bowel is removed, there is a greater chance for lifelong digestive problems. Bowel movements may become irregular. Work with your provider to learn the best ways to manage any symptoms you may have, and to protect your health.  When to call your healthcare provider  Call your provider right away if you have any of the following:    Severe pain (call 911)    Belly swelling or cramping that won t go away    Can t pass stool or gas    Nausea or vomiting (especially if the vomit looks or smells like stool)   Date Last Reviewed: 7/1/2016 2000-2018 [employer]. [address] [place] [address]. All rights reserved. This information is not intended as a substitute for professional medical care. Always follow your healthcare professional's instructions.          Hernia (Adult)    A hernia can happen when there is a weakness or defect in the wall of the abdomen or groin. Intestines or nearby tissues may move from their usual location and push through the weakness in the wall. This can cause a hernia (bulge) you may see or feel.  Causes and risk factors   A hernia may be present at birth. Or it may be caused by the wear and tear of daily living. Certain factors can make a hernia more likely. These can include:    Heavy lifting    Straining, whether from lifting, movement, or  constipation    Chronic cough    Injury to the abdominal wall    Excess weight    Pregnancy    Prior surgery    Older age    Family history of hernia  Symptoms  Symptoms of a hernia may come on suddenly. Or they may appear slowly over time. Some common symptoms include:    Bulge in the groin area, around the navel, or in the scrotum (the bulge may get bigger when you stand and go away when you lie down)    Pain or pressure around the bulge    Pain during activities such as lifting, coughing, or sneezing    A feeling of weakness or pressure in the groin    Pain or swelling in the scrotum  Types of hernias  There are different types of hernia. The type you have depends on its location:    Inguinal. This type is in the groin or scrotum. It is more common in men. But, women can get this hernia, too.    Femoral. This type is in the groin, upper thigh (where the leg bends), or labia. It is more common in women.    Ventral. This type is in the abdominal wall.    Umbilical. This type occurs around the navel (belly button).    Incisional. This type occurs at the site of a previous surgery.  The condition of the hernia can help determine how urgently it needs to be treated.    Reducible. It goes back in by itself, or it can be pushed back in.    Irreducible. It can t be pushed back in.    Incarcerated/strangulated. The intestine is trapped (incarcerated). If this happens, you won t be able to push the bulge back in. If the incarcerated hernia isn t treated, it may become strangulated. This means the area loses blood supply and the tissue may die. This requires emergency surgery. You need treatment right away.  In most cases, a hernia will not heal on its own.You may need surgery to repair the defect in the abdominal wall or groin. You ll be told more about surgery, if needed.  If your symptoms are not severe, treatment may sometimes be delayed. In such cases, you will need regular follow-up visits with the provider. You ll be  asked to keep track of your symptoms and to watch for signs of more serious problems. You may also be given guidelines similar to the home care instructions below.  Home care  To help keep a hernia from getting worse, you may be advised to:    Avoid heavy lifting and straining as directed.    Take steps to prevent constipation, such as eating more fiber and drinking more water. This may help reduce straining that can occur when having a bowel movement. Reducing straining may help keep your symptoms from getting worse.    Maintain a healthy weight or lose excess weight. This can help reduce strain on abdominal muscles and tissues.    Stop smoking. This can help prevent coughing that may also strain abdominal muscles and tissues.  Follow-up care  Follow up with your healthcare provider, or as directed. If imaging tests were done, they will be reviewed a doctor. You will be told the results and any new findings that may affect your care.  When to seek medical advice  Call your healthcare provider right away if any of these occur:    Hernia hardens, swells, or grows larger    Hernia can no longer be pushed back in    Pain moves to the lower right abdomen (just below the waistline), or spreads to the back  Call 911  Call 911 if any of these occur:    Severe pain, redness, or tenderness in the area near the hernia    Pain worsens quickly and doesn t get better    Inability to have a bowel movement or pass gas    Fever of 100.4 F (38 C) or higher, or as directed by your healthcare provider  Date Last Reviewed: 3/1/2018    6032-0760 [employer]. [address] [place] [address]. All rights reserved. This information is not intended as a substitute for professional medical care. Always follow your healthcare professional's instructions.        Procedural Sedation  Procedural sedation is medicine to ease discomfort, pain, and anxiety during a procedure. The medicine is often given through an IV  (intravenous) line in your arm or hand. In some cases, the medicine may be taken by mouth or inhaled. While you are under sedation, you will likely be awake. But you may not remember anything afterward.  Why procedural sedation is used  Sedation is used for many types of procedures. The goal is to reduce pain, anxiety, and stressful memories of a procedure. It can also help your healthcare provider treat you. For example, having a broken bone fixed may be easier if you feel relaxed.  Procedural sedation is used only for short, basic procedures. It is not used for complex surgeries. Some procedures that use this type of sedation include:    Dental surgery    Breast biopsy, to take a sample of breast tissue    Endoscopy, to look at gastrointestinal problems    Bronchoscopy, to check for lung problems    Bone or joint realignment, to fix a broken bone or dislocated joint    Minor foot or skin surgery    Electrical cardioversion, to restore a normal heart rhythm    Lumbar puncture, to assess neurological disease  Risks of procedural sedation  Procedural sedation has some risks and possible side effects, such as:    Headache    Nausea and vomiting    Unpleasant memory of the procedure    Lowered rate of breathing    Changes in heart rate and blood pressure (rare)    Inhalation of stomach contents into your lungs (rare)  Side effects will likely go away shortly after the procedure. Your healthcare team will watch your heart rate and breathing during your sedation. This is to help prevent problems.  Your own risks may vary based on your age and your overall health. They also depend on the type of sedation you are given. Talk with your healthcare provider about the risks that apply most to you.  Getting ready for procedural sedation  Talk with your healthcare provider about how to get ready for your procedure. Tell him or her about all the medicines you take. This includes over-the-counter medicines such as ibuprofen. It  also includes vitamins, herbs, and other supplements. You may need to stop taking some medicines before the procedure, such as blood thinners and aspirin. If you smoke, you should stop to lessen the chance of a lung issue. Talk with your healthcare provider if you need help to stop smoking.  Tell your healthcare provider if you:    Have had any problems in the past with sedation or anesthesia    Have had any recent changes in your health, such as an infection or fever    Are pregnant or think you may be pregnant  Also be sure to:    Ask a family member or friend to take you home after the procedure. You can t drive on the day you receive sedation.    Not eat or drink after midnight the night before your procedure, if advised.    Not plan on making any important decisions, such as financial or legal, for the day after you receive the sedation.    Follow all other instructions from your healthcare provider.  During your procedural sedation  You may have your procedure in a hospital or a medical clinic. Sedation is done by a trained healthcare provider. In general, you can expect the following:    You will be given medicine through an IV line in your arm or hand. Or you may receive a shot. The medicine may also be given by mouth. Or you may inhale it through a mask.    If you receive medicine through an IV, you may feel the effects very quickly. You will start to feel relaxed and drowsy.    During the procedure, your heart rate, breathing, and blood pressure will be closely watched. Your breathing and blood pressure may decrease a little. But you will likely not need help with your breathing. You may receive a little extra oxygen through a mask or through some soft plastic prongs underneath your nose.    You will probably be awake the entire time. If you do fall asleep, you should be easy to wake up, if needed. You should feel little or no pain.    When your procedure is over, the sedative medicine will be  stopped.  After your procedural sedation  You will begin to feel more awake and aware. But you will likely be drowsy for a while afterward. You will be closely watched as you become more alert. You may have a faint memory of the procedure. Or you may not remember it at all.  You should be able to return home within an hour or two after your procedure. Plan to have someone stay with you for a few hours. Side effects such as headache and nausea may go away quickly. Tell your healthcare provider if they continue.  Don t drive or make any important decisions for at least 24 hours. Be sure to follow all after-care instructions.      When to call your healthcare provider  Have someone call your healthcare provider right away if you have any of these:    Drowsiness that gets worse    Weakness or dizziness that gets worse    Repeated vomiting    You can t be awakened    Severe or ongoing pain from the procedure, not relieved by the pain medicine   Date Last Reviewed: 2/1/2017 2000-2018 [employer]. [address] [place] [address]. All rights reserved. This information is not intended as a substitute for professional medical care. Always follow your healthcare professional's instructions.             Your next 10 appointments already scheduled     Nov 21, 2018 11:00 AM CST   Pre-Op physical with Michael Ni MD   [facility]    [address] [place] [address] [phone]              24 Hour Appointment Hotline       To make an appointment at any [facility], call [phone] ([phone]). If you don't have a family doctor or clinic, we will help you find one. [facility] are conveniently located to serve the needs of you and your family.             Review of your medicines      START taking        Dose / Directions Last dose taken    ondansetron 4 MG ODT tab   Commonly known as:  ZOFRAN ODT   Dose:  4 mg    Quantity:  10 tablet        Take 1 tablet (4 mg) by mouth every 8 hours as needed for nausea or vomiting   Refills:  0          Our records show that you are taking the medicines listed below. If these are incorrect, please call your family doctor or clinic.        Dose / Directions Last dose taken    finasteride 5 MG tablet   Commonly known as:  PROSCAR   Dose:  1 tablet   Quantity:  90 tablet        Take 1 tablet (5 mg) by mouth daily   Refills:  3        ibuprofen 200 MG tablet   Commonly known as:  ADVIL/MOTRIN   Dose:  200 mg        Take 200 mg by mouth at onset of headache. 3 at night   Refills:  0        Multi-vitamin Tabs tablet   Generic drug:  multivitamin, therapeutic with minerals        1 tab daily   Refills:  0        omeprazole 20 MG CR capsule   Commonly known as:  priLOSEC   Dose:  20 mg   Quantity:  90 capsule        Take 1 capsule (20 mg) by mouth daily   Refills:  3        sildenafil 100 MG tablet   Commonly known as:  VIAGRA   Dose:  100 mg   Quantity:  6 tablet        Take 1 tablet (100 mg) by mouth daily as needed 30 min to 4 hrs before sex.   Refills:  11        tadalafil 20 MG tablet   Commonly known as:  CIALIS   Dose:  20 mg   Quantity:  6 tablet        Take 1 tablet (20 mg) by mouth daily as needed   Refills:  11                Prescriptions were sent or printed at these locations (1 Prescription)                   Other Prescriptions                Printed at Department/Unit printer (1 of 1)         ondansetron (ZOFRAN ODT) 4 MG ODT tab                Procedures and tests performed during your visit     Abd/pelvis CT,  IV  contrast only TRAUMA / AAA    Basic metabolic panel    CBC with platelets differential    CT Pelvis Soft Tissue wo Contrast    Creatinine POCT    ISTAT creatinine nursing POCT    Lactic acid    Lactic acid whole blood    UA with Microscopic      Orders Needing Specimen Collection     None      Pending Results     No orders found for last 3 day(s).            Pending  Culture Results     No orders found for last 3 day(s).            Pending Results Instructions     If you had any lab results that were not finalized at the time of your Discharge, you can call the ED Lab Result RN at [phone]. You will be contacted by this team for any positive Lab results or changes in treatment. The nurses are available 7 days a week from 10A to 6:30P.  You can leave a message 24 hours per day and they will return your call.        Test Results From Your Hospital Stay        11/18/2018  8:58 PM      Component Results     Component Value Ref Range & Units Status    WBC 9.7 4.0 - 11.0 10e9/L Final    RBC Count 4.93 4.4 - 5.9 10e12/L Final    Hemoglobin 14.0 13.3 - 17.7 g/dL Final    Hematocrit 43.4 40.0 - 53.0 % Final    MCV 88 78 - 100 fl Final    MCH 28.4 26.5 - 33.0 pg Final    MCHC 32.3 31.5 - 36.5 g/dL Final    RDW 13.0 10.0 - 15.0 % Final    Platelet Count 181 150 - 450 10e9/L Final    Diff Method Automated Method  Final    % Neutrophils 81.4 % Final    % Lymphocytes 11.9 % Final    % Monocytes 6.1 % Final    % Eosinophils 0.1 % Final    % Basophils 0.3 % Final    % Immature Granulocytes 0.2 % Final    Nucleated RBCs 0 0 /100 Final    Absolute Neutrophil 7.9 1.6 - 8.3 10e9/L Final    Absolute Lymphocytes 1.2 0.8 - 5.3 10e9/L Final    Absolute Monocytes 0.6 0.0 - 1.3 10e9/L Final    Absolute Eosinophils 0.0 0.0 - 0.7 10e9/L Final    Absolute Basophils 0.0 0.0 - 0.2 10e9/L Final    Abs Immature Granulocytes 0.0 0 - 0.4 10e9/L Final    Absolute Nucleated RBC 0.0  Final         11/18/2018  9:14 PM      Component Results     Component Value Ref Range & Units Status    Sodium 136 133 - 144 mmol/L Final    Potassium 3.6 3.4 - 5.3 mmol/L Final    Chloride 101 94 - 109 mmol/L Final    Carbon Dioxide 27 20 - 32 mmol/L Final    Anion Gap 8 3 - 14 mmol/L Final    Glucose 163 (H) 70 - 99 mg/dL Final    Urea Nitrogen 21 7 - 30 mg/dL Final    Creatinine 1.01 0.66 - 1.25 mg/dL Final    GFR Estimate 72  >60 mL/min/1.7m2 Final    Non  GFR Calc    GFR Estimate If Black 87 >60 mL/min/1.7m2 Final    African American GFR Calc    Calcium 9.4 8.5 - 10.1 mg/dL Final         11/18/2018  9:10 PM      Component Results     Component Value Ref Range & Units Status    Color Urine Yellow  Final    Appearance Urine Cloudy  Final    Glucose Urine Negative NEG^Negative mg/dL Final    Bilirubin Urine Negative NEG^Negative Final    Ketones Urine Negative NEG^Negative mg/dL Final    Specific Gravity Urine 1.020 1.003 - 1.035 Final    Blood Urine Large (A) NEG^Negative Final    pH Urine 7.0 5.0 - 7.0 pH Final    Protein Albumin Urine Negative NEG^Negative mg/dL Final    Urobilinogen mg/dL 0.0 0.0 - 2.0 mg/dL Final    Nitrite Urine Negative NEG^Negative Final    Leukocyte Esterase Urine Moderate (A) NEG^Negative Final    Source Midstream Urine  Final    WBC Urine 4 0 - 5 /HPF Final    RBC Urine 29 (H) 0 - 2 /HPF Final    Squamous Epithelial /HPF Urine 29 (H) 0 - 1 /HPF Final    Mucous Urine Present (A) NEG^Negative /LPF Final         11/18/2018  8:58 PM      Component Results     Component Value Ref Range & Units Status    Lactic Acid 2.5 (H) 0.7 - 2.0 mmol/L Final         11/18/2018 10:44 PM      Narrative     CT ABDOMEN AND PELVIS WITH CONTRAST 11/18/2018 8:58 PM     HISTORY: RLQ pain, vomiting, mass. Evaluate strangulated hernia with  obstruction.     COMPARISON: None.    TECHNIQUE: Volumetric helical acquisition of CT images from the lung  bases through the symphysis pubis after the administration of 98 mL  Isovue-370  intravenous contrast. Radiation dose for this scan was  reduced using automated exposure control, adjustment of the mA and/or  kV according to patient size, or iterative reconstruction technique.    FINDINGS: Right inguinal hernia containing small bowel which is  obstructing. There is moderate diverticulosis without evidence for  diverticulitis. There are mild atherosclerotic changes of the  visualized  aorta and its branches. There is no evidence of aortic  dissection or aneurysm. Prostatomegaly. Normal appendix. The liver,  spleen, adrenal glands, kidneys, and pancreas demonstrate no worrisome  focal lesion. Survey of the visualized bony structures demonstrates no  destructive bony lesions. Trace peripheral fibrosis and/or atelectasis  in the lungs.        Impression     IMPRESSION: Right inguinal hernia containing small bowel which is  obstructing proximally. No perforation.     MICHAEL MCELROY MD         11/18/2018  8:47 PM      Component Results     Component Value Ref Range & Units Status    Creatinine 1.1 0.66 - 1.25 mg/dL Final    GFR Estimate 65 >60 mL/min/1.7m2 Final    GFR Estimate If Black 79 >60 mL/min/1.7m2 Final         11/18/2018 10:44 PM      Narrative     CT PELVIS WITHOUT CONTRAST 11/18/2018 10:15 PM     HISTORY: Right hernia with obstruction, evaluate if complete  reduction.     COMPARISON: Earlier same day.    Technique: Volumetric helical acquisition of axial CT image data were  acquired through the pelvis without intravenous or oral contrast. The  axial image data were used to reconstructed sagittal and coronal  images. Radiation dose for this scan was reduced using automated  exposure control, adjustment of the mA and/or kV according to patient  size, or iterative reconstruction technique.    Findings: The hernia is reduced. Fat-containing femoral hernia  persists. Small fat-containing left inguinal hernia. Small bowel  remains dilated.        Impression     IMPRESSION: Right inguinal hernia containing bowel has been reduced.  Small bowel remains dilated.    MICHAEL MCELROY MD         11/18/2018 11:03 PM      Component Results     Component Value Ref Range & Units Status    Lactic Acid 1.0 0.4 - 2.0 mmol/L Final                Clinical Quality Measure: Blood Pressure Screening     Your blood pressure was checked while you were in the emergency department today. The last reading we obtained was   BP: 135/84 . Please read the guidelines below about what these numbers mean and what you should do about them.  If your systolic blood pressure (the top number) is less than 120 and your diastolic blood pressure (the bottom number) is less than 80, then your blood pressure is normal. There is nothing more that you need to do about it.  If your systolic blood pressure (the top number) is 120-139 or your diastolic blood pressure (the bottom number) is 80-89, your blood pressure may be higher than it should be. You should have your blood pressure rechecked within a year by a primary care provider.  If your systolic blood pressure (the top number) is 140 or greater or your diastolic blood pressure (the bottom number) is 90 or greater, you may have high blood pressure. High blood pressure is treatable, but if left untreated over time it can put you at risk for heart attack, stroke, or kidney failure. You should have your blood pressure rechecked by a primary care provider within the next 4 weeks.  If your provider in the emergency department today gave you specific instructions to follow-up with your doctor or provider even sooner than that, you should follow that instruction and not wait for up to 4 weeks for your follow-up visit.        Thank you for choosing [facility]       Thank you for choosing [facility] for your care. Our goal is always to provide you with excellent care. Hearing back from our patients is one way we can continue to improve our services. Please take a few minutes to complete the written survey that you may receive in the mail after you visit with us. Thank you!        [facility] Information     [facility] gives you secure access to your electronic health record. If you see a primary care provider, you can also send messages to your care team and make appointments. If you have questions, please call your primary care clinic.  If you do not have a primary care provider, please call [phone] and they will  assist you.        Care EveryWhere ID     This is your Care EveryWhere ID. This could be used by other organizations to access your [place] medical records  [id number]        Equal Access to Services     NASH BLACKMON : Sheila Rodriguez, haley muller, edita díaz, fransico gibson. So [facility] [phone].    ATENCIÓN: Si habla español, tiene a tolentino disposición servicios gratuitos de asistencia lingüística. Llame al [phone].    We comply with applicable federal civil rights laws and [place] laws. We do not discriminate on the basis of race, color, national origin, age, disability, sex, sexual orientation, or gender identity.            After Visit Summary       This is your record. Keep this with you and show to your community pharmacist(s) and doctor(s) at your next visit.

## 2018-11-19 VITALS
HEIGHT: 73 IN | HEART RATE: 86 BPM | TEMPERATURE: 98.2 F | WEIGHT: 198 LBS | RESPIRATION RATE: 16 BRPM | BODY MASS INDEX: 26.24 KG/M2 | DIASTOLIC BLOOD PRESSURE: 77 MMHG | SYSTOLIC BLOOD PRESSURE: 143 MMHG | OXYGEN SATURATION: 94 %

## 2018-11-19 PROBLEM — K40.90 HERNIA, INGUINAL: Status: ACTIVE | Noted: 2018-11-19

## 2018-11-19 PROCEDURE — 99214 OFFICE O/P EST MOD 30 MIN: CPT | Performed by: SURGERY

## 2018-11-19 PROCEDURE — 99218 ZZC INITIAL OBSERVATION CARE,LEVL I: CPT | Performed by: INTERNAL MEDICINE

## 2018-11-19 PROCEDURE — 49507 PRP I/HERN INIT BLOCK >5 YR: CPT

## 2018-11-19 PROCEDURE — 25000128 H RX IP 250 OP 636: Performed by: INTERNAL MEDICINE

## 2018-11-19 PROCEDURE — A9270 NON-COVERED ITEM OR SERVICE: HCPCS | Mod: GY | Performed by: EMERGENCY MEDICINE

## 2018-11-19 PROCEDURE — 25000132 ZZH RX MED GY IP 250 OP 250 PS 637: Mod: GY | Performed by: INTERNAL MEDICINE

## 2018-11-19 PROCEDURE — 25000125 ZZHC RX 250: Performed by: EMERGENCY MEDICINE

## 2018-11-19 PROCEDURE — G0378 HOSPITAL OBSERVATION PER HR: HCPCS

## 2018-11-19 PROCEDURE — A9270 NON-COVERED ITEM OR SERVICE: HCPCS | Mod: GY | Performed by: INTERNAL MEDICINE

## 2018-11-19 PROCEDURE — 25000132 ZZH RX MED GY IP 250 OP 250 PS 637: Mod: GY | Performed by: EMERGENCY MEDICINE

## 2018-11-19 PROCEDURE — 96376 TX/PRO/DX INJ SAME DRUG ADON: CPT

## 2018-11-19 PROCEDURE — 96361 HYDRATE IV INFUSION ADD-ON: CPT

## 2018-11-19 PROCEDURE — 25000131 ZZH RX MED GY IP 250 OP 636 PS 637: Mod: GY | Performed by: EMERGENCY MEDICINE

## 2018-11-19 PROCEDURE — 99207 ZZC DOWN CODE DUE TO INITIAL EXAM: CPT | Performed by: INTERNAL MEDICINE

## 2018-11-19 RX ORDER — SODIUM CHLORIDE 9 MG/ML
INJECTION, SOLUTION INTRAVENOUS CONTINUOUS
Status: DISCONTINUED | OUTPATIENT
Start: 2018-11-19 | End: 2018-11-19 | Stop reason: HOSPADM

## 2018-11-19 RX ORDER — PROCHLORPERAZINE 25 MG
12.5 SUPPOSITORY, RECTAL RECTAL EVERY 12 HOURS PRN
Status: DISCONTINUED | OUTPATIENT
Start: 2018-11-19 | End: 2018-11-19 | Stop reason: HOSPADM

## 2018-11-19 RX ORDER — FINASTERIDE 5 MG/1
5 TABLET, FILM COATED ORAL DAILY
Status: DISCONTINUED | OUTPATIENT
Start: 2018-11-19 | End: 2018-11-19 | Stop reason: HOSPADM

## 2018-11-19 RX ORDER — ONDANSETRON 4 MG/1
4 TABLET, ORALLY DISINTEGRATING ORAL ONCE
Status: COMPLETED | OUTPATIENT
Start: 2018-11-19 | End: 2018-11-19

## 2018-11-19 RX ORDER — ACETAMINOPHEN 325 MG/1
650 TABLET ORAL EVERY 4 HOURS PRN
Status: DISCONTINUED | OUTPATIENT
Start: 2018-11-19 | End: 2018-11-19 | Stop reason: HOSPADM

## 2018-11-19 RX ORDER — PROCHLORPERAZINE MALEATE 5 MG
5 TABLET ORAL EVERY 6 HOURS PRN
Status: DISCONTINUED | OUTPATIENT
Start: 2018-11-19 | End: 2018-11-19 | Stop reason: HOSPADM

## 2018-11-19 RX ORDER — ONDANSETRON 4 MG/1
4 TABLET, ORALLY DISINTEGRATING ORAL EVERY 8 HOURS PRN
Qty: 10 TABLET | Refills: 0 | Status: SHIPPED | OUTPATIENT
Start: 2018-11-19 | End: 2018-11-19

## 2018-11-19 RX ORDER — NALOXONE HYDROCHLORIDE 0.4 MG/ML
.1-.4 INJECTION, SOLUTION INTRAMUSCULAR; INTRAVENOUS; SUBCUTANEOUS
Status: DISCONTINUED | OUTPATIENT
Start: 2018-11-19 | End: 2018-11-19 | Stop reason: HOSPADM

## 2018-11-19 RX ORDER — ONDANSETRON 4 MG/1
4 TABLET, ORALLY DISINTEGRATING ORAL EVERY 8 HOURS PRN
Qty: 6 TABLET | Refills: 0 | Status: SHIPPED | OUTPATIENT
Start: 2018-11-19 | End: 2018-12-04

## 2018-11-19 RX ORDER — ONDANSETRON 2 MG/ML
4 INJECTION INTRAMUSCULAR; INTRAVENOUS EVERY 6 HOURS PRN
Status: DISCONTINUED | OUTPATIENT
Start: 2018-11-19 | End: 2018-11-19 | Stop reason: HOSPADM

## 2018-11-19 RX ORDER — HYDROMORPHONE HYDROCHLORIDE 1 MG/ML
0.3 INJECTION, SOLUTION INTRAMUSCULAR; INTRAVENOUS; SUBCUTANEOUS
Status: DISCONTINUED | OUTPATIENT
Start: 2018-11-19 | End: 2018-11-19 | Stop reason: HOSPADM

## 2018-11-19 RX ORDER — ONDANSETRON 4 MG/1
4 TABLET, ORALLY DISINTEGRATING ORAL EVERY 6 HOURS PRN
Status: DISCONTINUED | OUTPATIENT
Start: 2018-11-19 | End: 2018-11-19 | Stop reason: HOSPADM

## 2018-11-19 RX ORDER — ACETAMINOPHEN 650 MG/1
650 SUPPOSITORY RECTAL EVERY 4 HOURS PRN
Status: DISCONTINUED | OUTPATIENT
Start: 2018-11-19 | End: 2018-11-19 | Stop reason: HOSPADM

## 2018-11-19 RX ADMIN — ONDANSETRON 4 MG: 2 INJECTION INTRAMUSCULAR; INTRAVENOUS at 04:17

## 2018-11-19 RX ADMIN — SODIUM CHLORIDE: 9 INJECTION, SOLUTION INTRAVENOUS at 04:10

## 2018-11-19 RX ADMIN — ONDANSETRON 4 MG: 4 TABLET, ORALLY DISINTEGRATING ORAL at 02:39

## 2018-11-19 RX ADMIN — LIDOCAINE HYDROCHLORIDE 30 ML: 20 SOLUTION ORAL; TOPICAL at 01:12

## 2018-11-19 RX ADMIN — FINASTERIDE 5 MG: 5 TABLET, FILM COATED ORAL at 09:34

## 2018-11-19 RX ADMIN — OMEPRAZOLE 20 MG: 20 CAPSULE, DELAYED RELEASE ORAL at 09:34

## 2018-11-19 NOTE — ED TRIAGE NOTES
Pt here with c/o pain to R groin/abd pain that he noticed tonight. One episode of emesis PTA. No dysuria or flank pain. ABC intact.

## 2018-11-19 NOTE — CONSULTS
Consult Date:  11/19/2018      REASON FOR CONSULTATION:  Incarcerated right inguinal hernia, status post reduction.      HISTORY OF PRESENT ILLNESS:  Mr. Bro is a 78-year-old gentleman, retired from computer programming.  He came to the ER yesterday with abdominal pain, primarily diffusely, associated with some mild nausea and a single episode of emesis.  He had not had a recognized right inguinal hernia, but had a history of a left inguinal herniorrhaphy some number of years ago.  His CAT scan has a bowel obstruction secondary to an incarcerated right inguinal hernia which was subsequently reduced successfully under conscious sedation.  This morning the patient states that he feels well.  He denies any residual pain and states that he feels hungry.  He does not recall any events of heavy lifting, straining, coughing or sneezing as of late.  He does have a history of benign prostatic hyperplasia but states that he does not have to push to get the urine stream going.      PAST MEDICAL AND SURGICAL HISTORY:  Includes an elevated PSA, reflux, urinary frequency.  He has a history of a lipoma excision, prostate biopsies, colonoscopy, tonsillectomy, left inguinal hernia repair and rotator cuff repair.      CURRENT MEDICATIONS:  At the time of evaluation, the patient is on Proscar, Prilosec, Advil, multivitamin, Viagra and Cialis.      ALLERGIES:  THE PATIENT HAS NO RECOGNIZED DRUG ALLERGIES.      SOCIAL HISTORY:  The patient is retired.  He was previously a .  He is  and a former smoker.      PHYSICAL EXAMINATION:   VITAL SIGNS:  Mr. Bro is afebrile, temperature this morning is 98.4, pulse 75 with a blood pressure 140/70, respiratory rate 16 with 93% saturations on room air.   GENERAL:  Alert and appropriate, nontoxic.   HEART:  Sounds are regular with a possible subtle systolic murmur.   LUNGS:  Breath sounds are clear, without wheezes or crackles.   ABDOMEN:  Benign.  He has hyperactive  bowel sounds this morning.  He has no abdominal tenderness.  Examination of the right groin reveals no inguinal hernia contents present, his groin is nontender.  There is perhaps a subtle lipoma at the inferior ridge of the groin crease but not at the site of the inguinal canal.  I am unable to detect a left groin incision.      LABORATORY EXAMS:  Notable for white blood cell count of 9.7 thousand, hemoglobin of 14.0.  Electrolyte profile is normal.  Initial lactate was 2.5, which normalized with hydration to 1.0.      IMAGING:  I reviewed both the patient's pre and post-reduction CAT scans.  On the initial CT, there is a mechanical obstruction associated with bowel contents within a right inguinal hernia.  Post-reduction there is some persistent edema in the subcutaneous fat adjacent to the canal consistent with my exam findings but no bowel contained within the hernia.      IMPRESSION AND RECOMMENDATIONS:  This is a 78-year-old gentleman with a previous left inguinal hernia repair, who presented with a bowel obstruction secondary to incarcerated right inguinal hernia which was successfully reduced in the ER under conscious sedation and is remaining reduce this morning.  Thankfully, the patient is pain-free and hungry.  At present I would not recommend urgent herniorrhaphy given the persistent edema that is present in the tissues . Ideally we would wait a week or two to let this subside and fix this in a scheduled fashion as an outpatient.  I will trial him with diet and phone my schedulers to arrange for outpatient herniorrhaphy in the coming weeks.  He can discharge to home if he tolerates a diet today.      Thank you very much for this consultation.         FATUMA GIBSON MD             D: 2018   T: 2018   MT: PRASANNA      Name:     GRABIEL CHAVARRIA   MRN:      [id number]        Account:       [id number]   :      1940           Consult Date:  2018      Document: [id number]       cc: Michael  Last REYES

## 2018-11-19 NOTE — ED NOTES
.Observation Brochure and Video  Observation status based on provider's order. Observation Brochure was given and video watched.  Indigo Waddell RN

## 2018-11-19 NOTE — ED NOTES
[facility]  ED Nurse Handoff Report    Jack Bro is a 78 year old male   ED Chief complaint: Abdominal Pain  . ED Diagnosis:   Final diagnoses:   Inguinal hernia, incarcerated   SBO (small bowel obstruction) (H)     Allergies: No Known Allergies    Code Status: DNR  Activity level - Baseline/Home:  Independent. Activity Level - Current:   Stand with Assist. Lift room needed: No. Bariatric: No   Needed: No   Isolation: No. Infection: Not Applicable.     Vital Signs:   Vitals:    11/18/18 2353 11/19/18 0003 11/19/18 0023 11/19/18 0043   BP:  144/77 135/83 135/84   Resp:       Temp:       TempSrc:       SpO2: 96%  94%    Weight:           Cardiac Rhythm:  ,      Pain level: 0-10 Pain Scale: 0  Patient confused: No. Patient Falls Risk: Yes.   Elimination Status: Has voided   Patient Report - Initial Complaint: RLQ pain.   Focused Assessment: Jack Bro is a 78 year old male who presents with abdominal pain. The patient reports that he started to experience lower abdominal pain when he woke up this morning. He notes that he also started to notice a lump on the lower right abdomin/groin area that has grown throughout the night. The patient annotates that he vomited once and it made the pain temporarily improve but that bowel movements worsen the pain. He denies any dysuria, hematuria, or history of similar symptoms.     Tests Performed: ct, labs  Abnormal Results:   CT Pelvis Soft Tissue wo Contrast   Final Result   IMPRESSION: Right inguinal hernia containing bowel has been reduced.   Small bowel remains dilated.      MICHAEL MCELROY MD      Abd/pelvis CT,  IV  contrast only TRAUMA / AAA   Final Result   IMPRESSION: Right inguinal hernia containing small bowel which is   obstructing proximally. No perforation.       MICHAEL MCELROY MD        Labs Ordered and Resulted from Time of ED Arrival Up to the Time of Departure from the ED   BASIC METABOLIC PANEL - Abnormal; Notable for the  following:        Result Value    Glucose 163 (*)     All other components within normal limits   ROUTINE UA WITH MICROSCOPIC - Abnormal; Notable for the following:     Blood Urine Large (*)     Leukocyte Esterase Urine Moderate (*)     RBC Urine 29 (*)     Squamous Epithelial /HPF Urine 29 (*)     Mucous Urine Present (*)     All other components within normal limits   LACTIC ACID WHOLE BLOOD - Abnormal; Notable for the following:     Lactic Acid 2.5 (*)     All other components within normal limits   CBC WITH PLATELETS DIFFERENTIAL   CREATININE POCT   LACTIC ACID   ISTAT CREATININE NURSING POCT   Treatments provided: fluids, pain meds  Family Comments: family at bedside  OBS brochure/video discussed/provided to patient:  N/A  ED Medications:   Medications   HYDROmorphone (PF) (DILAUDID) injection 0.5 mg (0.5 mg Intravenous Given 11/18/18 2336)   propofol (DIPRIVAN) injection 10 mg/mL vial (not administered)   propofol (DIPRIVAN) 200 MG/20ML injection (not administered)   lidocaine (viscous) (XYLOCAINE) 2 % 15 mL, alum & mag hydroxide-simethicone (MYLANTA ES/MAALOX  ES) 15 mL GI Cocktail (not administered)   HYDROmorphone (PF) (DILAUDID) injection 0.5 mg (0.5 mg Intravenous Given 11/18/18 2034)   acetaminophen (TYLENOL) tablet 1,000 mg (1,000 mg Oral Given 11/18/18 2044)   lactated ringers BOLUS 1,000 mL (0 mLs Intravenous Stopped 11/18/18 2223)   iopamidol (ISOVUE-370) solution 500 mL (98 mLs Intravenous Given 11/18/18 2052)   0.9% sodium chloride BOLUS (0 mLs Intravenous Stopped 11/18/18 2053)   0.9% sodium chloride BOLUS (0 mLs Intravenous Stopped 11/18/18 2303)   ondansetron (ZOFRAN) injection 4 mg (4 mg Intravenous Given 11/18/18 2336)     Drips infusing:  No  For the majority of the shift, the patient's behavior Green. Interventions performed were na.     Severe Sepsis OR Septic Shock Diagnosis Present: No      ED Nurse Name/Phone Number: Allison Muñoz,   1:07 AM    RECEIVING UNIT ED HANDOFF REVIEW    Above ED  Nurse Handoff Report was reviewed: Yes  Reviewed by: Glo Agudelo on November 19, 2018 at 3:41 AM

## 2018-11-19 NOTE — PROGRESS NOTES
"PRIMARY DIAGNOSIS: \"GENERIC\" NURSING  OUTPATIENT/OBSERVATION GOALS TO BE MET BEFORE DISCHARGE:  1. ADLs back to baseline: Yes    2. Activity and level of assistance: Up with standby assistance.    3. Pain status: Improved but still requiring IV narcotics.    4. Return to near baseline physical activity: Yes     Discharge Planner Nurse   Safe discharge environment identified: Yes  Barriers to discharge: Yes       Entered by: Glo Agudelo 11/19/2018 4:03 AM     Please review provider order for any additional goals.   Nurse to notify provider when observation goals have been met and patient is ready for discharge.  "

## 2018-11-19 NOTE — DISCHARGE INSTRUCTIONS
Please make an appointment to follow up with your primary care provider in 3-5 days if not improving.    Return to ER immediately if you develop: worsening symptoms, Fever > 101, persistent nausea or vomiting OR you have any other concerns about your health.    Please make an appointment to follow up with [facility] [phone] to discuss hernia repair options        Small Bowel Obstruction     Small bowel obstruction can lead to tissue damage and even tissue death.   A small bowel obstruction occurs when part or all of the small intestine (bowel) is blocked. As a result, digestive contents can t move through the bowel properly and out of the body. Treatment is needed right away to remove the blockage. This can ease painful symptoms. It can also prevent serious problems, such as tissue death or bursting (rupture) of the small bowel. Without treatment, a small bowel obstruction can be fatal.  Causes of small bowel obstruction  A small bowel obstruction can be caused by:    Scar tissue (adhesions). These may form after belly (abdominal) surgery or an infection.    Hernia. A hernia is when an organ pushes through a weak spot or tear in the abdomen wall. Part of the small bowel can push out and be seen as a bulge under the belly. Hernias can also occur internally.    Certain health problems. These include when part of the bowel slides inside another part (intussusception). Other causes include irritable bowel disease such as Crohn s disease, and inflammation and sores in the intestine (ulcerative colitis).    Abnormal tissue growths (tumors). These can form on the inside or outside of the small bowel. They are usually due to cancer.  Symptoms of small bowel obstruction  Common symptoms include:    Belly cramping and pain    Belly swelling and bloating    Upset stomach (nausea) and vomiting    Can't  pass gas    Can't pass stool (constipation)    Diarrhea  Diagnosing small bowel obstruction  Your provider  will ask about your symptoms and health history. You ll also have a physical exam. Tests may also be done to confirm the problem. These can include:    Imaging tests. These provide pictures of the small bowel. Common tests include X-rays and a CT scan.    Blood tests. These check for infection and other problems, such as excess fluid loss (dehydration).    Upper GI (gastrointestinal) series with a small bowel follow-through. This test takes X-rays of the upper digestive tract from the mouth through the small bowel. An X-ray dye (contrast fluid) is used. The dye coats the inside of your upper digestive tract so it will show up clearly on X-rays.  Treating small bowel obstruction  Treatment takes place in a hospital. As part of your care, the following may be done:    No food or drink is given by mouth. This allows your bowels to rest.    An IV (intravenous) line is placed in a vein in your arm or hand. The IV line is used to give fluids. It may also be used to give medicines. These may be needed to ease pain, nausea, and other symptoms. They may also be needed to treat or prevent infections.    A soft, thin, flexible tube (nasogastric tube) is inserted through your nose and into your stomach. The tube is used to remove extra gas and fluid in your stomach and bowels. This helps to ease symptoms such as pain and swelling.    In severe cases, surgery is done. This may be needed if the small bowel is almost or totally blocked, or there is a hole in the bowel (bowel perforation). During surgery, the blockage is removed. Parts of the bowel may also be removed if there is tissue death. Other repair may be done as well, depending on what caused the blockage. Your healthcare provider will give you more information about surgery, if needed.    You ll be watched closely in the hospital until your symptoms improve. Your provider will tell you when you can go home.  Long-term concerns   After treatment, most people recover with  no lasting effects. If a long part of the bowel is removed, there is a greater chance for lifelong digestive problems. Bowel movements may become irregular. Work with your provider to learn the best ways to manage any symptoms you may have, and to protect your health.  When to call your healthcare provider  Call your provider right away if you have any of the following:    Severe pain (call 911)    Belly swelling or cramping that won t go away    Can t pass stool or gas    Nausea or vomiting (especially if the vomit looks or smells like stool)   Date Last Reviewed: 7/1/2016 2000-2018 [employer]. [address] [place] [id number]. All rights reserved. This information is not intended as a substitute for professional medical care. Always follow your healthcare professional's instructions.          Hernia (Adult)    A hernia can happen when there is a weakness or defect in the wall of the abdomen or groin. Intestines or nearby tissues may move from their usual location and push through the weakness in the wall. This can cause a hernia (bulge) you may see or feel.  Causes and risk factors   A hernia may be present at birth. Or it may be caused by the wear and tear of daily living. Certain factors can make a hernia more likely. These can include:    Heavy lifting    Straining, whether from lifting, movement, or constipation    Chronic cough    Injury to the abdominal wall    Excess weight    Pregnancy    Prior surgery    Older age    Family history of hernia  Symptoms  Symptoms of a hernia may come on suddenly. Or they may appear slowly over time. Some common symptoms include:    Bulge in the groin area, around the navel, or in the scrotum (the bulge may get bigger when you stand and go away when you lie down)    Pain or pressure around the bulge    Pain during activities such as lifting, coughing, or sneezing    A feeling of weakness or pressure in the groin    Pain or swelling in the  scrotum  Types of hernias  There are different types of hernia. The type you have depends on its location:    Inguinal. This type is in the groin or scrotum. It is more common in men. But, women can get this hernia, too.    Femoral. This type is in the groin, upper thigh (where the leg bends), or labia. It is more common in women.    Ventral. This type is in the abdominal wall.    Umbilical. This type occurs around the navel (belly button).    Incisional. This type occurs at the site of a previous surgery.  The condition of the hernia can help determine how urgently it needs to be treated.    Reducible. It goes back in by itself, or it can be pushed back in.    Irreducible. It can t be pushed back in.    Incarcerated/strangulated. The intestine is trapped (incarcerated). If this happens, you won t be able to push the bulge back in. If the incarcerated hernia isn t treated, it may become strangulated. This means the area loses blood supply and the tissue may die. This requires emergency surgery. You need treatment right away.  In most cases, a hernia will not heal on its own.You may need surgery to repair the defect in the abdominal wall or groin. You ll be told more about surgery, if needed.  If your symptoms are not severe, treatment may sometimes be delayed. In such cases, you will need regular follow-up visits with the provider. You ll be asked to keep track of your symptoms and to watch for signs of more serious problems. You may also be given guidelines similar to the home care instructions below.  Home care  To help keep a hernia from getting worse, you may be advised to:    Avoid heavy lifting and straining as directed.    Take steps to prevent constipation, such as eating more fiber and drinking more water. This may help reduce straining that can occur when having a bowel movement. Reducing straining may help keep your symptoms from getting worse.    Maintain a healthy weight or lose excess weight. This can  help reduce strain on abdominal muscles and tissues.    Stop smoking. This can help prevent coughing that may also strain abdominal muscles and tissues.  Follow-up care  Follow up with your healthcare provider, or as directed. If imaging tests were done, they will be reviewed a doctor. You will be told the results and any new findings that may affect your care.  When to seek medical advice  Call your healthcare provider right away if any of these occur:    Hernia hardens, swells, or grows larger    Hernia can no longer be pushed back in    Pain moves to the lower right abdomen (just below the waistline), or spreads to the back  Call 911  Call 911 if any of these occur:    Severe pain, redness, or tenderness in the area near the hernia    Pain worsens quickly and doesn t get better    Inability to have a bowel movement or pass gas    Fever of 100.4 F (38 C) or higher, or as directed by your healthcare provider  Date Last Reviewed: 3/1/2018    3678-2485 [employer]. [address] [place] [address]. All rights reserved. This information is not intended as a substitute for professional medical care. Always follow your healthcare professional's instructions.        Procedural Sedation  Procedural sedation is medicine to ease discomfort, pain, and anxiety during a procedure. The medicine is often given through an IV (intravenous) line in your arm or hand. In some cases, the medicine may be taken by mouth or inhaled. While you are under sedation, you will likely be awake. But you may not remember anything afterward.  Why procedural sedation is used  Sedation is used for many types of procedures. The goal is to reduce pain, anxiety, and stressful memories of a procedure. It can also help your healthcare provider treat you. For example, having a broken bone fixed may be easier if you feel relaxed.  Procedural sedation is used only for short, basic procedures. It is not used for complex surgeries. Some  procedures that use this type of sedation include:    Dental surgery    Breast biopsy, to take a sample of breast tissue    Endoscopy, to look at gastrointestinal problems    Bronchoscopy, to check for lung problems    Bone or joint realignment, to fix a broken bone or dislocated joint    Minor foot or skin surgery    Electrical cardioversion, to restore a normal heart rhythm    Lumbar puncture, to assess neurological disease  Risks of procedural sedation  Procedural sedation has some risks and possible side effects, such as:    Headache    Nausea and vomiting    Unpleasant memory of the procedure    Lowered rate of breathing    Changes in heart rate and blood pressure (rare)    Inhalation of stomach contents into your lungs (rare)  Side effects will likely go away shortly after the procedure. Your healthcare team will watch your heart rate and breathing during your sedation. This is to help prevent problems.  Your own risks may vary based on your age and your overall health. They also depend on the type of sedation you are given. Talk with your healthcare provider about the risks that apply most to you.  Getting ready for procedural sedation  Talk with your healthcare provider about how to get ready for your procedure. Tell him or her about all the medicines you take. This includes over-the-counter medicines such as ibuprofen. It also includes vitamins, herbs, and other supplements. You may need to stop taking some medicines before the procedure, such as blood thinners and aspirin. If you smoke, you should stop to lessen the chance of a lung issue. Talk with your healthcare provider if you need help to stop smoking.  Tell your healthcare provider if you:    Have had any problems in the past with sedation or anesthesia    Have had any recent changes in your health, such as an infection or fever    Are pregnant or think you may be pregnant  Also be sure to:    Ask a family member or friend to take you home after the  procedure. You can t drive on the day you receive sedation.    Not eat or drink after midnight the night before your procedure, if advised.    Not plan on making any important decisions, such as financial or legal, for the day after you receive the sedation.    Follow all other instructions from your healthcare provider.  During your procedural sedation  You may have your procedure in a hospital or a medical clinic. Sedation is done by a trained healthcare provider. In general, you can expect the following:    You will be given medicine through an IV line in your arm or hand. Or you may receive a shot. The medicine may also be given by mouth. Or you may inhale it through a mask.    If you receive medicine through an IV, you may feel the effects very quickly. You will start to feel relaxed and drowsy.    During the procedure, your heart rate, breathing, and blood pressure will be closely watched. Your breathing and blood pressure may decrease a little. But you will likely not need help with your breathing. You may receive a little extra oxygen through a mask or through some soft plastic prongs underneath your nose.    You will probably be awake the entire time. If you do fall asleep, you should be easy to wake up, if needed. You should feel little or no pain.    When your procedure is over, the sedative medicine will be stopped.  After your procedural sedation  You will begin to feel more awake and aware. But you will likely be drowsy for a while afterward. You will be closely watched as you become more alert. You may have a faint memory of the procedure. Or you may not remember it at all.  You should be able to return home within an hour or two after your procedure. Plan to have someone stay with you for a few hours. Side effects such as headache and nausea may go away quickly. Tell your healthcare provider if they continue.  Don t drive or make any important decisions for at least 24 hours. Be sure to follow all  after-care instructions.      When to call your healthcare provider  Have someone call your healthcare provider right away if you have any of these:    Drowsiness that gets worse    Weakness or dizziness that gets worse    Repeated vomiting    You can t be awakened    Severe or ongoing pain from the procedure, not relieved by the pain medicine   Date Last Reviewed: 2/1/2017 2000-2018 [employer]. [address] [place] [id number]. All rights reserved. This information is not intended as a substitute for professional medical care. Always follow your healthcare professional's instructions.

## 2018-11-19 NOTE — PROGRESS NOTES
Chart reviewed and patient seen, please refer to admission H&P for details of care plan.     77 yo male who presents to hospital with incarcerated right inguinal hernia, which was reduced in ER. He is feeling overall much better with resolution of pain but still with some intermittent nausea. Abdomen is soft, non-tender, right groin area without any palpable hernia. Continue supportive cares, advance diet as tolerated. Plan for discharge home once able to tolerate PO and follow up with general surgery as outpatient for repair of hernia once edema has settled down.

## 2018-11-19 NOTE — ED NOTES
When this nurse entered room with discharge papers patient had an additional emesis.  States feels better after emesis, remains a little nauseous.  MD notified.

## 2018-11-19 NOTE — PROGRESS NOTES
Assisted with conscious sedation. Respirations, Spo2 and EtCO2 monitored throughout procedure. Respiratory status maintained without incident.      Brenda Jameson, RRT

## 2018-11-19 NOTE — ED PROVIDER NOTES
History     Chief Complaint:    Abdominal Pain    HPI   Jack Bro is a 78 year old male who presents with abdominal pain. The patient reports that he started to experience lower abdominal pain when he woke up this morning. He notes that he also started to notice a lump on the lower right abdomin/groin area that has grown throughout the night. The patient annotates that he vomited once and it made the pain temporarily improve but that bowel movements worsen the pain. He denies any dysuria, hematuria, or history of similar symptoms.     Allergies:  No known drug allergies.       Medications:    Proscar  Prilosec  Viagra  Cialis      Past Medical History:    Elevated prostate specific antigen  Esophageal reflux  Urination frequency     Past Surgical History:    Removal of lipomas  Prostate biopsies on two occasions  Colonoscopy  Tonsillectomy   Hernia repair  Rotator cuff repair    Family History:    Mother: C.A.D.  Father: Eye disorder, cerebrovascular disease  Maternal Grandfather: Cerebrovascular Disease  Brother: Neurologic disorder, hypertension    Social History:  The patient was accompanied to the ED by wife.  Smoking Status: Former Smoker  Smokeless Tobacco: Never Used  Alcohol Use: Positive  Marital Status:       Review of Systems   Constitutional:        Lump in lower abdomin   Gastrointestinal: Positive for abdominal pain and vomiting.   Genitourinary: Negative for dysuria and hematuria.   All other systems reviewed and are negative.    Physical Exam     Patient Vitals for the past 24 hrs:   BP Temp Temp src Heart Rate Resp SpO2 Weight   11/18/18 2245 - - - - - 95 % -   11/18/18 2230 - - - - - 98 % -   11/18/18 2200 130/71 - - 76 16 95 % -   11/18/18 2145 127/75 - - 67 15 100 % -   11/18/18 2118 135/75 - - - - 95 % -   11/18/18 2105 - - - - - 98 % -   11/18/18 2104 114/72 - - - - 97 % -   11/18/18 2006 137/90 98.2  F (36.8  C) Oral 96 20 97 % 88.6 kg (195 lb 5.2 oz)     Physical Exam    Constitutional: He appears distressed (Uncomfortable appearing secondary to pain).   HENT:   Right Ear: External ear normal.   Left Ear: External ear normal.   Nose: Nose normal.   Eyes: Conjunctivae and lids are normal.   Neck: Neck supple. No tracheal deviation present.   Cardiovascular: Regular rhythm and intact distal pulses.    Pulmonary/Chest: Breath sounds normal. No respiratory distress.   Abdominal: Soft. He exhibits no distension. There is tenderness (Tenderness palpation right lower quadrant there is a palpable mass in the right groin, no overlying erythema). There is no rebound and no guarding.   Musculoskeletal:   No peripheral edema   Neurological: He is alert.   MAEE, no gross focal motor or sensory deficit   Skin: Skin is warm and dry. He is not diaphoretic.   Psychiatric: He has a normal mood and affect.   Nursing note and vitals reviewed.        Emergency Department Course     Imaging:  Radiology findings were communicated with the patient who voiced understanding of the findings.    CT Pelvis Soft Tissue wo Contrast   Final Result   IMPRESSION: Right inguinal hernia containing bowel has been reduced.   Small bowel remains dilated.      MICHAEL MCELROY MD      Abd/pelvis CT,  IV  contrast only TRAUMA / AAA   Final Result   IMPRESSION: Right inguinal hernia containing small bowel which is   obstructing proximally. No perforation.       MICHAEL MCELROY MD        Laboratory:  Laboratory findings were communicated with the patient who voiced understanding of the findings.    CBC: WBC 9.7, HGB 14.0,   BMP: Glucose 163 (H) o/w WNL (Creatinine 1.01)  Creatinine POCT: Creatinine 1.1, GFR 65  UA: Blood large (A), Leukocyte Esterase moderate (A), RBC 29 (H), Squamous Epithelial 29 (H), Mucous present (A), o/w WNL.  Lactic Acid (Resulted: 2058): 2.5 (H)    Procedures:  [facility] Procedure Note        Sedation:      Performed by: Ryan Bolaños  Authorized by: Ryan Marroquin  Miky    Pre-Procedure Assessment done at 2100.    Expected Level:  Moderate Sedation    Indication:  Sedation is required to allow for Hernia reduction    Consent obtained from patient after discussing the risks, benefits and alternatives.    PO Intake:  Appropriately NPO for procedure    ASA Class:  Class 2 - MILD SYSTEMIC DISEASE, NO ACUTE PROBLEMS, NO FUNCTIONAL LIMITATIONS.    Mallampati:  Grade 1:  Soft palate, uvula, tonsillar pillars, and posterior pharyngeal wall visible    Lungs: Lungs Clear with good breath sounds bilaterally.     Heart: Normal heart sounds and rate    History and physical reviewed and no updates needed. I have reviewed the lab findings, diagnostic data, medications, and the plan for sedation. I have determined this patient to be an appropriate candidate for the planned sedation and procedure and have reassessed the patient IMMEDIATELY PRIOR to sedation and procedure.      Sedation Post Procedure Summary:    Prior to the start of the procedure and with procedural staff participation, I verbally confirmed the patient s identity using two indicators, relevant allergies, that the procedure was appropriate and matched the consent or emergent situation, and that the correct equipment/implants were available. Immediately prior to starting the procedure I conducted the Time Out with the procedural staff and re-confirmed the patient s name, procedure, and site/side. (The Joint Commission universal protocol was followed.)  Yes      Sedatives: Propofol    Vital signs, airway, End Tidal CO2 and pulse oximetry were monitored and remained stable throughout the procedure and sedation was maintained until the procedure was complete.  The patient was monitored by staff until sedation discharge criteria were met.    Patient tolerance: Patient tolerated the procedure well with no immediate complications.    Time of sedation in minutes:  15 minutes from beginning to end of physician one to one  monitoring.        Procedure note: Hernia reduction  Indication right incarcerated hernia  Procedure: Using procedural sedation with Dilaudid and propofol the hernia sac was grasped and gently worked back in through the abdominal wall defect.  There was no complication with the sedation as noted above.  There were no immediate complications.  Procedure performed by myself.  Written informed consent.      Interventions:  Medications   HYDROmorphone (PF) (DILAUDID) injection 0.5 mg (0.5 mg Intravenous Given 11/18/18 2122)   propofol (DIPRIVAN) injection 10 mg/mL vial (not administered)   propofol (DIPRIVAN) 200 MG/20ML injection (not administered)   HYDROmorphone (PF) (DILAUDID) injection 0.5 mg (0.5 mg Intravenous Given 11/18/18 2034)   acetaminophen (TYLENOL) tablet 1,000 mg (1,000 mg Oral Given 11/18/18 2044)   lactated ringers BOLUS 1,000 mL (0 mLs Intravenous Stopped 11/18/18 2223)   iopamidol (ISOVUE-370) solution 500 mL (98 mLs Intravenous Given 11/18/18 2052)   0.9% sodium chloride BOLUS (0 mLs Intravenous Stopped 11/18/18 2053)   0.9% sodium chloride BOLUS (0 mLs Intravenous Stopped 11/18/18 2303)     Emergency Department Course:    2006 Nursing notes and vitals reviewed.    2032 I performed an exam of the patient as documented above.     2033 IV was inserted and blood was drawn for laboratory testing, results above.    2033 A urine sample was obtained for laboratory testing as documented above.    2042 IV was inserted and blood was drawn for laboratory testing, results above.    2052 The patient was sent for a CT while in the emergency department, results above.     2205 The patient was sent for a CT while in the emergency department, results above.     2243 I spoke with Dr. Barraza of the surgery service from [facility] regarding patient's presentation, findings, and plan of care.      Impression & Plan      Medical Decision Making:  Jack Bro is a 78 year old male who presents to the emergency  department today for evaluation of abdominal pain was found to have an incarcerated inguinal hernia with small bowel obstruction.      Hernia was reduced here in the emergency room under procedural sedation.  Repeat the CT scan to ensure resolution.  Mild elevation of the lactate initially this was repeated after IV fluids and return to normal.  Unfortunately had recurrence of his abdominal pain and nausea requiring another dose of Dilaudid and Zofran.  Repeat abdominal exam showed no recurrence of the mass in the right lower quadrant.  I discussed with surgery after the reduction plan was to observe for a few hours here in the emergency room to ensure symptoms were improving however if he continues to struggle symptoms will admit him for observation.    Update: He improved during the latter part of his ED a no recurrent emesis so that have frequent belching.  Repeat abdominal exams were benign without recurrence of the mass in the right lower quadrant or significant abdominal tenderness.  After about a 6-hour ED stay we discussed observation admission versus discharge home he would strongly like to go home we discussed what to watch out for when to return here to the emergency department and following up with the surgeons to talk about hernia repair in the future.    Update 0244: Unfortunately this patient was in the process of getting dressed for anticipation of discharge home he had an episode of emesis and continues to feel nauseous.  Given this change I encouraged him to stay here in the hospital for observation and symptom control until this bowel obstruction/ileus starts to improve and we can advance his diet.  He was comfortable with that plan.  Abdominal exam at this time was negative for significant tenderness or recurrence of his hernia    Diagnosis:      ICD-10-CM    1. Inguinal hernia, incarcerated K40.30    2. SBO (small bowel obstruction) (H) K56.609         Disposition:   Admit to  observation    Discharge Medications:  New Prescriptions    No medications on file     Scribe Disclosure:  I, Orla Severson, am serving as a scribe at 8:24 PM on 11/18/2018 to document services personally performed by Ryan Bolaños, based on my observations and the provider's statements to me.    [facility] EMERGENCY DEPARTMENT       Ryan Bolaños MD  11/19/18 0211       Ryan Bolaños MD  11/19/18 0245       Ryan Bolaños MD  11/19/18 0246

## 2018-11-19 NOTE — H&P
Admitted:     11/18/2018      CHIEF COMPLAINT:  Abdominal pain, nausea, emesis.      HISTORY OF PRESENT ILLNESS:  This is a 78-year-old gentleman with a history of GERD, BPH, who presents with abdominal pain that occurred this morning.  The pain is in his right groin.  He also noticed a lump over there.  His last bowel movement was on Saturday.  He denies any fevers or chills.  He denies any chest pain or shortness of breath, lightheadedness, or dizziness.  In the ER over here he was seen by Dr. Ryan Bolaños.  He was noted to have a right inguinal hernia, which was obstructed.  This was subsequently reduced in the ER with moderate sedation.  I am asked to admit him for further evaluation.      ALLERGIES:  NO KNOWN DRUG ALLERGIES.      MEDICATIONS:  Include a PPI, as well as Proscar and Viagra.      PAST MEDICAL HISTORY:  Significant for GERD, BPH.      PAST SURGICAL HISTORY:  Significant for left inguinal hernia repair, left shoulder surgery.      FAMILY HISTORY:  Significant for brother with Parkinson's disease.      SOCIAL HISTORY:  Does not smoke or drink alcohol.      REVIEW OF SYSTEMS:  As mentioned in the HPI.  All other systems are reviewed and deemed unremarkable and negative.      PHYSICAL EXAMINATION:   VITAL SIGNS:  His temperature is 98.2, pulse is 86, blood pressure is 135/84, respiratory rate 16, O2 sat is 97% on room air.   GENERAL:  Alert, awake, oriented, coherent, nontoxic, and in no distress now that his hernia has been reduced.     HEENT:  Pupils equal, round, reactive to light.   LUNGS:  Clear to auscultation bilaterally.   CARDIOVASCULAR:  Regular rate.  S1, S2 normal.  No murmurs or gallops.   ABDOMEN:  Soft, nontender, nondistended with good bowel sounds.  He has mild swelling in his right inguinal region.   EXTREMITIES:  There is no edema.   SKIN : No rash.  NEUROLOGY : VALDEZ's.     LABORATORY DATA:  Obtained today included a basic metabolic panel, which was grossly unremarkable.  His lactic  acid was 2.5.  CBC with diff was grossly unremarkable.  The urinalysis does show hematuria.      IMAGING:  CT of the abdomen and pelvis with contrast obtained here showed a right inguinal hernia containing small bowel, which is obstructing proximally.  No perforation.  Post-reduction CT showed that the right inguinal hernia containing bowel has been reduced.  Small bowel remains dilated.      ASSESSMENT AND PLAN:  Right inguinal hernia with incarceration and bowel obstruction.  This was reduced down in the ER.  We will admit him.  We will place him on IV fluids, IV antiemetics, IV narcotics as needed.  His symptoms have improved.  We will have General Surgery see him for definitive repair.      CODE STATUS:  FULL CODE.      DISPOSITION:  We will admit him under observation status.         MORENO HER MD             D: 2018   T: 2018   MT: ELISA      Name:     GRABIEL CHAVARRIA   MRN:      [id number]        Account:      [id number]   :      1940        Admitted:     2018                   Document: [id number]

## 2018-11-19 NOTE — PHARMACY-ADMISSION MEDICATION HISTORY
Admission medication history interview status for this patient is complete. See [facility] admission navigator for allergy information, prior to admission medications and immunization status.     Medication history interview source(s):Patient  Medication history resources (including written lists, pill bottles, clinic record):None  Primary pharmacy:      Changes made to PTA medication list:  Added: ASA  Deleted: Cialis  Changed: ibuprofen    Actions taken by pharmacist (provider contacted, etc):sticky note for MD , slade lee    Additional medication history information:None    Medication reconciliation/reorder completed by provider prior to medication history? No    Do you take OTC medications (eg tylenol, ibuprofen, fish oil, eye/ear drops, etc)? Y(Y/N)    For patients on insulin therapy: NA (Y/N)        Prior to Admission medications    Medication Sig Last Dose Taking? Auth Provider   aspirin 81 MG tablet Take 81 mg by mouth daily 11/18/2018 at Unknown time Yes Unknown, Entered By History   finasteride (PROSCAR) 5 MG tablet Take 1 tablet (5 mg) by mouth daily 11/18/2018 at Unknown time Yes Michael Ni MD   ibuprofen (ADVIL,MOTRIN) 200 MG tablet Take 400-600 mg by mouth every 8 hours as needed 3 at night Past Week at Unknown time Yes Reported, Patient   MULTI-VITAMIN OR TABS 1 tab daily 11/18/2018 at Unknown time Yes Vincent Joyce MD   omeprazole (PRILOSEC) 20 MG CR capsule Take 1 capsule (20 mg) by mouth daily 11/18/2018 at Unknown time Yes Michael Ni MD          sildenafil (VIAGRA) 100 MG tablet Take 1 tablet (100 mg) by mouth daily as needed 30 min to 4 hrs before sex. More than a month at Unknown time  Michael Ni MD

## 2018-11-20 NOTE — DISCHARGE SUMMARY
Chart reviewed and patient seen, please refer to admission H&P for details of care plan.     77 yo male who presents to hospital with incarcerated right inguinal hernia, which was reduced in ER. He is feeling overall much better with resolution of pain. Abdomen is soft, non-tender, right groin area without any palpable hernia. Patient tolerating PO intake, regular diet, had a BM. Will discharge home today as previously planned. Follow up with general surgery as outpatient for repair of hernia once edema has settled down.              Consultations:   SURGERY GENERAL IP CONSULT            Procedures / Imaging and Significant Results:   Results for orders placed or performed during the hospital encounter of 11/18/18   Abd/pelvis CT,  IV  contrast only TRAUMA / AAA    Narrative    CT ABDOMEN AND PELVIS WITH CONTRAST 11/18/2018 8:58 PM     HISTORY: RLQ pain, vomiting, mass. Evaluate strangulated hernia with  obstruction.     COMPARISON: None.    TECHNIQUE: Volumetric helical acquisition of CT images from the lung  bases through the symphysis pubis after the administration of 98 mL  Isovue-370  intravenous contrast. Radiation dose for this scan was  reduced using automated exposure control, adjustment of the mA and/or  kV according to patient size, or iterative reconstruction technique.    FINDINGS: Right inguinal hernia containing small bowel which is  obstructing. There is moderate diverticulosis without evidence for  diverticulitis. There are mild atherosclerotic changes of the  visualized aorta and its branches. There is no evidence of aortic  dissection or aneurysm. Prostatomegaly. Normal appendix. The liver,  spleen, adrenal glands, kidneys, and pancreas demonstrate no worrisome  focal lesion. Survey of the visualized bony structures demonstrates no  destructive bony lesions. Trace peripheral fibrosis and/or atelectasis  in the lungs.      Impression    IMPRESSION: Right inguinal hernia containing small bowel which  is  obstructing proximally. No perforation.     MICHAEL MCELROY MD   CT Pelvis Soft Tissue wo Contrast    Narrative    CT PELVIS WITHOUT CONTRAST 11/18/2018 10:15 PM     HISTORY: Right hernia with obstruction, evaluate if complete  reduction.     COMPARISON: Earlier same day.    Technique: Volumetric helical acquisition of axial CT image data were  acquired through the pelvis without intravenous or oral contrast. The  axial image data were used to reconstructed sagittal and coronal  images. Radiation dose for this scan was reduced using automated  exposure control, adjustment of the mA and/or kV according to patient  size, or iterative reconstruction technique.    Findings: The hernia is reduced. Fat-containing femoral hernia  persists. Small fat-containing left inguinal hernia. Small bowel  remains dilated.      Impression    IMPRESSION: Right inguinal hernia containing bowel has been reduced.  Small bowel remains dilated.    MICHAEL MCELROY MD              Discharge Procedure Orders  Reason for your hospital stay   Order Comments: Bowel obstruction due to hernia     Follow-up and recommended labs and tests    Order Comments: Follow up with the surgeon regarding hernia surgery in 2 weeks     Diet   Order Comments: Follow this diet upon discharge: Orders Placed This Encounter     Advance Diet as Tolerated: Regular Diet Adult   Order Specific Question Answer Comments   Is discharge order? Yes              Discharge Medication List as of 11/19/2018  4:57 PM      CONTINUE these medications which have CHANGED    Details   ondansetron (ZOFRAN ODT) 4 MG ODT tab Take 1 tablet (4 mg) by mouth every 8 hours as needed for nausea or vomiting, Disp-6 tablet, R-0, E-Prescribe         CONTINUE these medications which have NOT CHANGED    Details   aspirin 81 MG tablet Take 81 mg by mouth daily, Historical      finasteride (PROSCAR) 5 MG tablet Take 1 tablet (5 mg) by mouth daily, Disp-90 tablet, R-3, E-Prescribe      ibuprofen  (ADVIL,MOTRIN) 200 MG tablet Take 400-600 mg by mouth every 8 hours as needed 3 at night, Historical      MULTI-VITAMIN OR TABS 1 tab daily, Oral, R-0, Historical      omeprazole (PRILOSEC) 20 MG CR capsule Take 1 capsule (20 mg) by mouth daily, Disp-90 capsule, R-3, E-Prescribe      sildenafil (VIAGRA) 100 MG tablet Take 1 tablet (100 mg) by mouth daily as needed 30 min to 4 hrs before sex., Disp-6 tablet, R-11, Local Print

## 2018-11-21 ENCOUNTER — OFFICE VISIT (OUTPATIENT)
Dept: INTERNAL MEDICINE | Facility: CLINIC | Age: 78
End: 2018-11-21
Payer: MEDICARE

## 2018-11-21 ENCOUNTER — TELEPHONE (OUTPATIENT)
Dept: INTERNAL MEDICINE | Facility: CLINIC | Age: 78
End: 2018-11-21

## 2018-11-21 VITALS
TEMPERATURE: 97.6 F | SYSTOLIC BLOOD PRESSURE: 130 MMHG | WEIGHT: 196 LBS | DIASTOLIC BLOOD PRESSURE: 68 MMHG | BODY MASS INDEX: 25.98 KG/M2 | RESPIRATION RATE: 16 BRPM | OXYGEN SATURATION: 99 % | HEIGHT: 73 IN | HEART RATE: 87 BPM

## 2018-11-21 DIAGNOSIS — Z01.818 PREOP GENERAL PHYSICAL EXAM: Primary | ICD-10-CM

## 2018-11-21 DIAGNOSIS — K40.90 RIGHT INGUINAL HERNIA: ICD-10-CM

## 2018-11-21 DIAGNOSIS — K56.609 SMALL BOWEL OBSTRUCTION (H): ICD-10-CM

## 2018-11-21 DIAGNOSIS — H26.9 CATARACT OF RIGHT EYE, UNSPECIFIED CATARACT TYPE: ICD-10-CM

## 2018-11-21 PROCEDURE — 99214 OFFICE O/P EST MOD 30 MIN: CPT | Performed by: INTERNAL MEDICINE

## 2018-11-21 NOTE — MR AVS SNAPSHOT
After Visit Summary   11/21/2018    Jack Bro    MRN: [id number]           Patient Information     Date Of Birth          1940        Visit Information        Provider Department      11/21/2018 11:00 AM Michael Ni MD [facility]        Today's Diagnoses     Preop general physical exam    -  1    Cataract of right eye, unspecified cataract type        Small bowel obstruction (H)        Right inguinal hernia          Care Instructions      Before Your Surgery      Call your surgeon if there is any change in your health. This includes signs of a cold or flu (such as a sore throat, runny nose, cough, rash or fever).    Do not smoke, drink alcohol or take over the counter medicine (unless your surgeon or primary care doctor tells you to) for the 24 hours before and after surgery.    If you take prescribed drugs: Follow your doctor s orders about which medicines to take and which to stop until after surgery.    Eating and drinking prior to surgery: follow the instructions from your surgeon    Take a shower or bath the night before surgery. Use the soap your surgeon gave you to gently clean your skin. If you do not have soap from your surgeon, use your regular soap. Do not shave or scrub the surgery site.  Wear clean pajamas and have clean sheets on your bed.       Okay to wait until after surgery to take your daily medications.     Okay to go ahead with cataract surgery this Tuesday morning as planned.     Agree with seeing the general surgeon to see what they recommend regarding the right inguinal hernia. (It is possible that today's pre-op could qualify for a hernia repair preop, depending upon the timing, if surgery is done).           Follow-ups after your visit        Follow-up notes from your care team     Return in about 1 year (around 11/21/2019) for Physical Exam.      Who to contact     If you have questions or need follow up information about today's clinic visit  "or your schedule please contact [facility] directly at [phone].  Normal or non-critical lab and imaging results will be communicated to you by MyChart, letter or phone within 4 business days after the clinic has received the results. If you do not hear from us within 7 days, please contact the clinic through [facility] [employer] or phone. If you have a critical or abnormal lab result, we will notify you by phone as soon as possible.  Submit refill requests through [employer] or call your pharmacy and they will forward the refill request to us. Please allow 3 business days for your refill to be completed.          Additional Information About Your Visit        [facility] Information     [employer] gives you secure access to your electronic health record. If you see a primary care provider, you can also send messages to your care team and make appointments. If you have questions, please call your primary care clinic.  If you do not have a primary care provider, please call [phone] and they will assist you.        Care EveryWhere ID     This is your Care EveryWhere ID. This could be used by other organizations to access your [facility] medical records  [id number]        Your Vitals Were     Pulse Temperature Respirations Height Pulse Oximetry BMI (Body Mass Index)    87 97.6  F (36.4  C) (Oral) 16 6' 1\" (1.854 m) 99% 25.86 kg/m2       Blood Pressure from Last 3 Encounters:   11/21/18 130/68   11/19/18 143/77   09/12/18 130/72    Weight from Last 3 Encounters:   11/21/18 196 lb (88.9 kg)   11/19/18 198 lb (89.8 kg)   09/12/18 198 lb (89.8 kg)              Today, you had the following     No orders found for display       Primary Care Provider Office Phone # Fax #    Michael Ni [phone]       [address] [facility] [address]  [facility] [address]        Equal Access to Services     NASH BLACKMON AH: Sheila schmidto Soraquel, waaxda luqadaha, qaybta kaalmada aderon, fransico alfaro " hans talamantes ah. So [facility] [phone].    ATENCIÓN: Si habla jordan, tiene a tolentino disposición servicios gratuitos de asistencia lingüística. Juan chase [phone].    We comply with applicable federal civil rights laws and [place] laws. We do not discriminate on the basis of race, color, national origin, age, disability, sex, sexual orientation, or gender identity.            Thank you!     Thank you for choosing [facility]  for your care. Our goal is always to provide you with excellent care. Hearing back from our patients is one way we can continue to improve our services. Please take a few minutes to complete the written survey that you may receive in the mail after your visit with us. Thank you!             Your Updated Medication List - Protect others around you: Learn how to safely use, store and throw away your medicines at www.disposemymeds.org.          This list is accurate as of 11/21/18 11:50 AM.  Always use your most recent med list.                   Brand Name Dispense Instructions for use Diagnosis    aspirin 81 MG tablet      Take 81 mg by mouth daily        finasteride 5 MG tablet    PROSCAR    90 tablet    Take 1 tablet (5 mg) by mouth daily    Slowing of urinary stream       ibuprofen 200 MG tablet    ADVIL/MOTRIN     Take 400-600 mg by mouth every 8 hours as needed 3 at night        Multi-vitamin Tabs tablet   Generic drug:  multivitamin, therapeutic with minerals      1 tab daily        omeprazole 20 MG CR capsule    priLOSEC    90 capsule    Take 1 capsule (20 mg) by mouth daily    Gastroesophageal reflux disease without esophagitis       ondansetron 4 MG ODT tab    ZOFRAN ODT    6 tablet    Take 1 tablet (4 mg) by mouth every 8 hours as needed for nausea or vomiting    Inguinal hernia, incarcerated, SBO (small bowel obstruction) (H)       sildenafil 100 MG tablet    VIAGRA    6 tablet    Take 1 tablet (100 mg) by mouth daily as needed 30 min to 4 hrs before sex.    Erectile  dysfunction, unspecified erectile dysfunction type

## 2018-11-21 NOTE — PROGRESS NOTES
[place] [address]  [facility] [address]  [phone]  Dept: [phone]    PRE-OP EVALUATION:  Today's date: 2018    Jack Bro (: 1940) presents for pre-operative evaluation assessment as requested by Dr. Jacques.  He requires evaluation and anesthesia risk assessment prior to undergoing surgery/procedure for treatment of cataract, right eye .    Fax number for surgical facility: [phone]  Primary Physician: Michael Ni  Type of Anesthesia Anticipated: to be determined    Patient has a Health Care Directive or Living Will:  NO    Preop Questions 2018   Who is doing your surgery? duane    What are you having done? cataract   Date of Surgery/Procedure: 18   Facility or Hospital where procedure/surgery will be performed: [facility] specialty   1.  Do you have a history of Heart attack, stroke, stent, coronary bypass surgery, or other heart surgery? No   2.  Do you ever have any pain or discomfort in your chest? No   3.  Do you have a history of  Heart Failure? No   4.   Are you troubled by shortness of breath when:  walking on a level surface, or up a slight hill, or at night? No   5.  Do you currently have a cold, bronchitis or other respiratory infection? No   6.  Do you have a cough, shortness of breath, or wheezing? No   7.  Do you sometimes get pains in the calves of your legs when you walk? No   8. Do you or anyone in your family have previous history of blood clots? No   9.  Do you or does anyone in your family have a serious bleeding problem such as prolonged bleeding following surgeries or cuts? No   10. Have you ever had problems with anemia or been told to take iron pills? No   11. Have you had any abnormal blood loss such as black, tarry or bloody stools? No   12. Have you ever had a blood transfusion? No   13. Have you or any of your relatives ever had problems with anesthesia? No   14. Do you have sleep apnea, excessive  snoring or daytime drowsiness? No   15. Do you have any prosthetic heart valves? No   16. Do you have prosthetic joints? No     HPI:     HPI related to upcoming procedure: cataract, right eye    ED f/u  Patient was seen at the ED on 11/18/2018 for evaluation of a bowel obstruction. He had an incarcerated right inguinal hernia, which was reduced by general surgery manually. He was recommended following up with general surgery as an outpatient for repair of the hernia.     Since discharge he has not experienced any abdominal pains or vomiting. He has not had a bowel movement since hospital discharge 11/19 but he is able to pass gas. He is on a normal diet.     See problem list for active medical problems.  Problems all longstanding and stable, except as noted/documented.  See ROS for pertinent symptoms related to these conditions.                                                                                                                                                          .    MEDICAL HISTORY:     Patient Active Problem List    Diagnosis Date Noted     Hernia, inguinal 11/19/2018     Priority: Medium     Slowing of urinary stream 07/09/2014     Priority: Medium     Pain in joint, pelvic region and thigh 01/02/2012     Priority: Medium     Advance Care Planning 06/14/2011     Priority: Medium     Advance Care Planning 6/13/2016: ACP Facilitation Session:    Jack Bro presented for ACP Facilitation session at a group session. He was accompanied by spouse. Mickey Ureña information provided and resources reviewed. He currently wishes to give additional consideration to ACP  He currently has the following questions or concerns about Advance Care Planning: none.  Confirmed/documented legally designated decision maker(s). Follow-up meeting: not needed/applicable. Added by Ruth Auguste RN Advance Care Planning Liaison with [employer]  Parent voices understanding and acceptance of this advice and  will call back if any further questions or concerns.         CARDIOVASCULAR SCREENING; LDL GOAL LESS THAN 160 10/31/2010     Priority: Medium     Esophageal reflux 09/22/2004     Priority: Medium      Past Medical History:   Diagnosis Date     Elevated prostate specific antigen (PSA)     Previously followed [facility] annually; more than 2 biopsies     Esophageal reflux      Urination frequency      Past Surgical History:   Procedure Laterality Date     C NONSPECIFIC PROCEDURE      Removal of lipomas     C NONSPECIFIC PROCEDURE      Prostate biopsies on two occasions     COLONOSCOPY  8/5/2014    Diverticuli, o/w normal. Procedure: COLONOSCOPY;  Surgeon: Enmanuel Carroll MD;  Location:  GI     HC COLONOSCOPY THRU STOMA, DIAGNOSTIC  4/5/2004    Diverticuli, o/w normal     HC REMOVE TONSILS/ADENOIDS,<13 Y/O  1946    T & A <13y.o.     HC REPAIR SLIDING INGUINAL HERNIA  1989    lt     HERNIORRHAPHY INGUINAL Right 11/10/2016    Procedure: HERNIORRHAPHY INGUINAL;  Surgeon: Julio Lund MD;  Location:  OR     SURGICAL HISTORY OF -   1991    lt shoulder Rotator cuff repair     Current Outpatient Prescriptions   Medication Sig Dispense Refill     aspirin 81 MG tablet Take 81 mg by mouth daily       finasteride (PROSCAR) 5 MG tablet Take 1 tablet (5 mg) by mouth daily 90 tablet 3     ibuprofen (ADVIL,MOTRIN) 200 MG tablet Take 400-600 mg by mouth every 8 hours as needed 3 at night       MULTI-VITAMIN OR TABS 1 tab daily  0     omeprazole (PRILOSEC) 20 MG CR capsule Take 1 capsule (20 mg) by mouth daily 90 capsule 3     ondansetron (ZOFRAN ODT) 4 MG ODT tab Take 1 tablet (4 mg) by mouth every 8 hours as needed for nausea or vomiting 6 tablet 0     sildenafil (VIAGRA) 100 MG tablet Take 1 tablet (100 mg) by mouth daily as needed 30 min to 4 hrs before sex. 6 tablet 11     OTC products: None, except as noted above    No Known Allergies   Latex Allergy: NO    Social History   Substance Use Topics     Smoking status: Former  "Smoker     Packs/day: 0.50     Years: 20.00     Types: Cigarettes     Quit date: 9/22/1984     Smokeless tobacco: Never Used     Alcohol use 0.0 oz/week     0 Standard drinks or equivalent per week      Comment: About two drinks weekly     History   Drug Use No     REVIEW OF SYSTEMS:   CONSTITUTIONAL: NEGATIVE for fever, chills, change in weight  INTEGUMENTARY/SKIN: NEGATIVE for worrisome rashes, moles or lesions  EYES: NEGATIVE for vision changes or irritation  ENT/MOUTH: NEGATIVE for ear, mouth and throat problems  RESP: NEGATIVE for significant cough or SOB  BREAST: NEGATIVE for masses, tenderness or discharge  CV: NEGATIVE for chest pain, palpitations or peripheral edema  GI: NEGATIVE for nausea, abdominal pain, heartburn, or change in bowel habits  : NEGATIVE for frequency, dysuria, or hematuria  MUSCULOSKELETAL: NEGATIVE for significant arthralgias or myalgia  NEURO: NEGATIVE for weakness, dizziness or paresthesias  ENDOCRINE: NEGATIVE for temperature intolerance, skin/hair changes  HEME: NEGATIVE for bleeding problems  PSYCHIATRIC: NEGATIVE for changes in mood or affect    This document serves as a record of the services and decisions personally performed and made by Michael Ni MD. It was created on his behalf by Nilsa Ricardo, a trained medical scribe. The creation of this document is based on the provider's statements to the medical scribe.  Nilsa Ricardo November 21, 2018 11:39 AM     EXAM:   /68 (BP Location: Left arm, Patient Position: Sitting, Cuff Size: Adult Large)  Pulse 87  Temp 97.6  F (36.4  C) (Oral)  Resp 16  Ht 1.854 m (6' 1\")  Wt 88.9 kg (196 lb)  SpO2 99%  BMI 25.86 kg/m2       GENERAL APPEARANCE: healthy, alert and no distress     EYES: EOMI,  PERRL     HENT: ear canals and TM's normal and nose and mouth without ulcers or lesions     NECK: no adenopathy, no asymmetry, masses, or scars and thyroid normal to palpation     RESP: lungs clear to auscultation - no rales, " rhonchi or wheezes     CV: regular rates and rhythm, normal S1 S2, no S3 or S4 and no murmur, click or rub     ABDOMEN:  soft, nontender, no HSM or masses and bowel sounds normal     MS: extremities normal- no gross deformities noted, no evidence of inflammation in joints, FROM in all extremities.     SKIN: no suspicious lesions or rashes     NEURO: Normal strength and tone, sensory exam grossly normal, mentation intact and speech normal     PSYCH: mentation appears normal. and affect normal/bright     LYMPHATICS: No cervical adenopathy    DIAGNOSTICS:   No labs or EKG required for low risk surgery (cataract, skin procedure, breast biopsy, etc)    Recent Labs   Lab Test  11/18/18 2033 09/12/18   0934   HGB  14.0  13.7   PLT  181  152   NA  136  140   POTASSIUM  3.6  4.5   CR  1.01  0.92   A1C   --   5.8*     IMPRESSION:   Reason for surgery/procedure: cataract, right eye  Diagnosis/reason for consult: Clearance for surgery    The proposed surgical procedure is considered LOW risk.    REVISED CARDIAC RISK INDEX  The patient has the following serious cardiovascular risks for perioperative complications such as (MI, PE, VFib and 3  AV Block):  No serious cardiac risks  INTERPRETATION: 0 risks: Class I (very low risk - 0.4% complication rate)    The patient has the following additional risks for perioperative complications:  No identified additional risks      ICD-10-CM    1. Preop general physical exam Z01.818    2. Cataract of right eye, unspecified cataract type H26.9    3. Small bowel obstruction (H) K56.609    4. Right inguinal hernia K40.90      RECOMMENDATIONS:     --Patient is to take all scheduled medications on the day of surgery EXCEPT for modifications listed below.    APPROVAL GIVEN to proceed with proposed procedure, without further diagnostic evaluation      Okay to wait until after surgery to take your daily medications.     Okay to go ahead with cataract surgery this Tuesday morning as planned.      Agree with seeing the general surgeon to see what they recommend regarding the right inguinal hernia. (It is possible that today's pre-op could qualify for a hernia repair preop, depending upon the timing, if surgery is done).  The information in this document, created by the medical scribe for me, accurately reflects the services I personally performed and the decisions made by me. I have reviewed and approved this document for accuracy prior to leaving the patient care area.  November 21, 2018 11:50 AM    Signed Electronically by: Michael Ni MD    Copy of this evaluation report is provided to requesting physician.    Lj Preop Guidelines    Revised Cardiac Risk Index

## 2018-11-21 NOTE — PATIENT INSTRUCTIONS
Before Your Surgery      Call your surgeon if there is any change in your health. This includes signs of a cold or flu (such as a sore throat, runny nose, cough, rash or fever).    Do not smoke, drink alcohol or take over the counter medicine (unless your surgeon or primary care doctor tells you to) for the 24 hours before and after surgery.    If you take prescribed drugs: Follow your doctor s orders about which medicines to take and which to stop until after surgery.    Eating and drinking prior to surgery: follow the instructions from your surgeon    Take a shower or bath the night before surgery. Use the soap your surgeon gave you to gently clean your skin. If you do not have soap from your surgeon, use your regular soap. Do not shave or scrub the surgery site.  Wear clean pajamas and have clean sheets on your bed.       Okay to wait until after surgery to take your daily medications.     Okay to go ahead with cataract surgery this Tuesday morning as planned.     Agree with seeing the general surgeon to see what they recommend regarding the right inguinal hernia. (It is possible that today's pre-op could qualify for a hernia repair preop, depending upon the timing, if surgery is done).

## 2018-11-21 NOTE — TELEPHONE ENCOUNTER
IP F/U    Date: 11/19/18  Diagnosis: Inguinal Hernia, Incarcerated  Is patient active in care coordination? No  Was patient in TCU? No    Next 5 appointments (look out 90 days)     Nov 21, 2018 11:00 AM CST   Pre-Op physical with Michael Ni MD   [facility] ([facility])    [address]  [facility] [address]   [phone]

## 2018-11-30 ENCOUNTER — OFFICE VISIT (OUTPATIENT)
Dept: SURGERY | Facility: CLINIC | Age: 78
End: 2018-11-30
Payer: MEDICARE

## 2018-11-30 ENCOUNTER — TELEPHONE (OUTPATIENT)
Dept: SURGERY | Facility: CLINIC | Age: 78
End: 2018-11-30

## 2018-11-30 VITALS
BODY MASS INDEX: 25.98 KG/M2 | DIASTOLIC BLOOD PRESSURE: 72 MMHG | WEIGHT: 196 LBS | RESPIRATION RATE: 16 BRPM | HEART RATE: 76 BPM | OXYGEN SATURATION: 97 % | HEIGHT: 73 IN | SYSTOLIC BLOOD PRESSURE: 116 MMHG

## 2018-11-30 DIAGNOSIS — K40.91 RECURRENT RIGHT INGUINAL HERNIA: Primary | ICD-10-CM

## 2018-11-30 PROCEDURE — 99213 OFFICE O/P EST LOW 20 MIN: CPT | Performed by: SURGERY

## 2018-11-30 NOTE — PROGRESS NOTES
"Jack is a 78 year old male who presents in consultation for an inguinal hernia. He was seen by me on 11/19/18 after being admitted for observation following a successful reduction of an inguinal hernia in the ED. He has felt well since returning home and states in retrospect that he had not noted a hernia on the right prior to his ED evaluation. The patient notes that he believes he had a prior left inguinal hernia repair but review of the chart shows that he had a right inguinal hernia repair with [facility] in November 2016 by Dr Lund.    Constipation No   Dysuria No  Cough No    Pt's chart has been reviewed for PMH, PSH, allergies, medications, social history and family history.    ROS:  Pulm:  No shortness of breath, dyspnea on exertion, cough, or hemoptysis  CV:  negative  ABD:  See chief complaint  :  Negative  All other systems negative/normal    /72 (BP Location: Right arm, Cuff Size: Adult Large)  Pulse 76  Resp 16  Ht 6' 1\" (1.854 m)  Wt 196 lb (88.9 kg)  SpO2 97%  BMI 25.86 kg/m2  Physical exam:  Patient able to get up on table without difficulty.  Abdomen is abdomen is soft without significant tenderness, masses, organomegaly or guarding  bowel sounds are positive and no caput medusa noted.  Hernia  Left inguinal hernia is not present with valsalva              Right inguinal hernia is not present with valsalva; there is a ridge of fat inferior to the inguinal canal which does not augment with Valsalva and is not tender.   There is no left groin incision but there is a subtle right groin scar present.              Testicles are normal    Imaging: CT from 11/18/18 reviewed previously; bowel containing right inguinal hernia, reduced in second CT; edema in canal consistent with acute incarceration    Assesment: right inguinal hernia, recurrent    Plan: The nature of hernias, anatomic considerations, indications and approaches to repair were discussed.  Risks of operative intervention were " discussed including infection, bleeding, harm to structures including vessels and nerves as well as recurrence, which is about 5% per decade of life.  Post operative recuperation and activity limitations were reviewed as well. Because his hernia is recurrent after prior anterior/open repair, I have advised and recommended robotic assisted laparoscopy. The patient is interested in pursuing repair and we will assist in scheduling this with him.      Beni Webb MD  11/30/2018 3:42 PM    Please route or send letter to:  Primary Care Provider (PCP)

## 2018-11-30 NOTE — LETTER
"2018    RE: Jack Bro, : 1940      Jack is a 78 year old male who presents in consultation for an inguinal hernia. He was seen by me on 18 after being admitted for observation following a successful reduction of an inguinal hernia in the ED. He has felt well since returning home and states in retrospect that he had not noted a hernia on the right prior to his ED evaluation. The patient notes that he believes he had a prior left inguinal hernia repair but review of the chart shows that he had a right inguinal hernia repair with [facility] in 2016 by Dr Lund.     Constipation No   Dysuria No  Cough No     Pt's chart has been reviewed for PMH, PSH, allergies, medications, social history and family history.     ROS:  Pulm:  No shortness of breath, dyspnea on exertion, cough, or hemoptysis  CV:  negative  ABD:  See chief complaint  :  Negative  All other systems negative/normal     /72 (BP Location: Right arm, Cuff Size: Adult Large)  Pulse 76  Resp 16  Ht 6' 1\" (1.854 m)  Wt 196 lb (88.9 kg)  SpO2 97%  BMI 25.86 kg/m2  Physical exam:  Patient able to get up on table without difficulty.  Abdomen is abdomen is soft without significant tenderness, masses, organomegaly or guarding  bowel sounds are positive and no caput medusa noted.  Hernia  Left inguinal hernia is not present with valsalva              Right inguinal hernia is not present with valsalva; there is a ridge of fat inferior to the inguinal canal which does not augment with Valsalva and is not tender.                          There is no left groin incision but there is a subtle right groin scar present.              Testicles are normal     Imaging: CT from 18 reviewed previously; bowel containing right inguinal hernia, reduced in second CT; edema in canal consistent with acute incarceration     Assesment: right inguinal hernia, recurrent     Plan: The nature of hernias, anatomic considerations, " indications and approaches to repair were discussed.  Risks of operative intervention were discussed including infection, bleeding, harm to structures including vessels and nerves as well as recurrence, which is about 5% per decade of life.  Post operative recuperation and activity limitations were reviewed as well. Because his hernia is recurrent after prior anterior/open repair, I have advised and recommended robotic assisted laparoscopy. The patient is interested in pursuing repair and we will assist in scheduling this with him.        Beni Webb MD  11/30/2018 3:42 PM

## 2018-11-30 NOTE — TELEPHONE ENCOUNTER
Type of surgery: ROBOTIC ASSSITED LAPAROSCOPIC REPAIR RECURRENT RIGHT INGUINAL HERNIA WITH MESH   Location of surgery: [facility]  Date and time of surgery: 12/7/2018 @ 9:05 AM   Surgeon: FATUMA ANTOINE MD    Pre-Op Appt Date: PATIENT TO SCHEDULE    Post-Op Appt Date: PATIENT TO SCHEDULE     Packet sent out:   PACKET AND SOAP GIVEN TO PATIENT    Pre-cert/Authorization completed:  Not Applicable  Date: 11/30/2018    ROBOTIC ASSSITED LAPAROSCOPIC REPAIR RECURRENT RIGHT INGUINAL HERNIA WITH MESH    GENERAL H&P DONE 11/21/2018 120 MIN REQ PA ASSIST RMO NMS

## 2018-11-30 NOTE — MR AVS SNAPSHOT
After Visit Summary   11/30/2018    Jack Bro    MRN: [id number]           Patient Information     Date Of Birth          1940        Visit Information        Provider Department      11/30/2018 3:30 PM Beni Galicia MD [facility] [place] [facility]      Today's Diagnoses     Recurrent right inguinal hernia    -  1       Follow-ups after your visit        Your next 10 appointments already scheduled     Dec 07, 2018   Procedure with Beni Galicia MD   [facility] PeriOp Services (--)    [address]  [facility] [address]   [phone]            Dec 07, 2018  9:00 AM [facility] Same Day Surgery with Beni Galicia MD, Hiram Ceron PA-C   [facility] ([facility])    [facility] ([facility])   [phone]              Who to contact     If you have questions or need follow up information about today's clinic visit or your schedule please contact [facility] directly at [phone].  Normal or non-critical lab and imaging results will be communicated to you by Profylehart, letter or phone within 4 business days after the clinic has received the results. If you do not hear from us within 7 days, please contact the clinic through Adeptencet or phone. If you have a critical or abnormal lab result, we will notify you by phone as soon as possible.  Submit refill requests through EnerMotion or call your pharmacy and they will forward the refill request to us. Please allow 3 business days for your refill to be completed.          Additional Information About Your Visit        Profylehart Information     EnerMotion gives you secure access to your electronic health record. If you see a primary care provider, you can also send messages to your care team and make appointments. If you have questions, please  "call your primary care clinic.  If you do not have a primary care provider, please call [phone] and they will assist you.        Care EveryWhere ID     This is your Care EveryWhere ID. This could be used by other organizations to access your [facility] medical records  [id number]        Your Vitals Were     Pulse Respirations Height Pulse Oximetry BMI (Body Mass Index)       76 16 6' 1\" (1.854 m) 97% 25.86 kg/m2        Blood Pressure from Last 3 Encounters:   11/30/18 116/72   11/21/18 130/68   11/19/18 143/77    Weight from Last 3 Encounters:   11/30/18 196 lb (88.9 kg)   11/21/18 196 lb (88.9 kg)   11/19/18 198 lb (89.8 kg)              Today, you had the following     No orders found for display       Primary Care Provider Office Phone # Fax #    Michael Ni [phone]       [address] [facility] [address]  [place] [address]        Equal Access to Services     [facility]: Hadii aad ku hadasho Soomaali, waaxda luqadaha, qaybta kaalmada adeegyada, waxay idiin haymaryn angelina talamantes . So [facility] [phone].    ATENCIÓN: Si habla español, tiene a tolentino disposición servicios gratuitos de asistencia lingüística. Llame al [phone].    We comply with applicable federal civil rights laws and [place] laws. We do not discriminate on the basis of race, color, national origin, age, disability, sex, sexual orientation, or gender identity.            Thank you!     Thank you for choosing [facility]  for your care. Our goal is always to provide you with excellent care. Hearing back from our patients is one way we can continue to improve our services. Please take a few minutes to complete the written survey that you may receive in the mail after your visit with us. Thank you!             Your Updated Medication List - Protect others around you: Learn how to safely use, store and throw away your medicines at www.disposemymeds.org.          This list is accurate as of 11/30/18  " 3:52 PM.  Always use your most recent med list.                   Brand Name Dispense Instructions for use Diagnosis    aspirin 81 MG tablet    ASA     Take 81 mg by mouth daily        finasteride 5 MG tablet    PROSCAR    90 tablet    Take 1 tablet (5 mg) by mouth daily    Slowing of urinary stream       ibuprofen 200 MG tablet    ADVIL/MOTRIN     Take 400-600 mg by mouth every 8 hours as needed 3 at night        Multi-vitamin tablet   Generic drug:  multivitamin w/minerals      1 tab daily        omeprazole 20 MG DR capsule    priLOSEC    90 capsule    Take 1 capsule (20 mg) by mouth daily    Gastroesophageal reflux disease without esophagitis       ondansetron 4 MG ODT tab    ZOFRAN ODT    6 tablet    Take 1 tablet (4 mg) by mouth every 8 hours as needed for nausea or vomiting    Inguinal hernia, incarcerated, SBO (small bowel obstruction) (H)       sildenafil 100 MG tablet    VIAGRA    6 tablet    Take 1 tablet (100 mg) by mouth daily as needed 30 min to 4 hrs before sex.    Erectile dysfunction, unspecified erectile dysfunction type

## 2018-12-06 NOTE — H&P (VIEW-ONLY)
[place] [address]  [facility] [address]  [phone]  Dept: [phone]    PRE-OP EVALUATION:  Today's date: 2018    Jack Bro (: 1940) presents for pre-operative evaluation assessment as requested by Dr. Jacques.  He requires evaluation and anesthesia risk assessment prior to undergoing surgery/procedure for treatment of cataract, right eye .    Fax number for surgical facility: [phone]  Primary Physician: Michael Ni  Type of Anesthesia Anticipated: to be determined    Patient has a Health Care Directive or Living Will:  NO    Preop Questions 2018   Who is doing your surgery? duane    What are you having done? cataract   Date of Surgery/Procedure: 18   Facility or Hospital where procedure/surgery will be performed: [facility] specialty   1.  Do you have a history of Heart attack, stroke, stent, coronary bypass surgery, or other heart surgery? No   2.  Do you ever have any pain or discomfort in your chest? No   3.  Do you have a history of  Heart Failure? No   4.   Are you troubled by shortness of breath when:  walking on a level surface, or up a slight hill, or at night? No   5.  Do you currently have a cold, bronchitis or other respiratory infection? No   6.  Do you have a cough, shortness of breath, or wheezing? No   7.  Do you sometimes get pains in the calves of your legs when you walk? No   8. Do you or anyone in your family have previous history of blood clots? No   9.  Do you or does anyone in your family have a serious bleeding problem such as prolonged bleeding following surgeries or cuts? No   10. Have you ever had problems with anemia or been told to take iron pills? No   11. Have you had any abnormal blood loss such as black, tarry or bloody stools? No   12. Have you ever had a blood transfusion? No   13. Have you or any of your relatives ever had problems with anesthesia? No   14. Do you have sleep apnea, excessive  snoring or daytime drowsiness? No   15. Do you have any prosthetic heart valves? No   16. Do you have prosthetic joints? No     HPI:     HPI related to upcoming procedure: cataract, right eye    ED f/u  Patient was seen at the ED on 11/18/2018 for evaluation of a bowel obstruction. He had an incarcerated right inguinal hernia, which was reduced by general surgery manually. He was recommended following up with general surgery as an outpatient for repair of the hernia.     Since discharge he has not experienced any abdominal pains or vomiting. He has not had a bowel movement since hospital discharge 11/19 but he is able to pass gas. He is on a normal diet.     See problem list for active medical problems.  Problems all longstanding and stable, except as noted/documented.  See ROS for pertinent symptoms related to these conditions.                                                                                                                                                          .    MEDICAL HISTORY:     Patient Active Problem List    Diagnosis Date Noted     Hernia, inguinal 11/19/2018     Priority: Medium     Slowing of urinary stream 07/09/2014     Priority: Medium     Pain in joint, pelvic region and thigh 01/02/2012     Priority: Medium     Advance Care Planning 06/14/2011     Priority: Medium     Advance Care Planning 6/13/2016: ACP Facilitation Session:    Jack Bro presented for ACP Facilitation session at a group session. He was accompanied by spouse. Mickey Ureña information provided and resources reviewed. He currently wishes to give additional consideration to ACP  He currently has the following questions or concerns about Advance Care Planning: none.  Confirmed/documented legally designated decision maker(s). Follow-up meeting: not needed/applicable. Added by Ruth Auguste RN Advance Care Planning Liaison with [employer]  Parent voices understanding and acceptance of this advice and  will call back if any further questions or concerns.         CARDIOVASCULAR SCREENING; LDL GOAL LESS THAN 160 10/31/2010     Priority: Medium     Esophageal reflux 09/22/2004     Priority: Medium      Past Medical History:   Diagnosis Date     Elevated prostate specific antigen (PSA)     Previously followed [facility] annually; more than 2 biopsies     Esophageal reflux      Urination frequency      Past Surgical History:   Procedure Laterality Date     C NONSPECIFIC PROCEDURE      Removal of lipomas     C NONSPECIFIC PROCEDURE      Prostate biopsies on two occasions     COLONOSCOPY  8/5/2014    Diverticuli, o/w normal. Procedure: COLONOSCOPY;  Surgeon: Enmanuel Carroll MD;  Location:  GI     HC COLONOSCOPY THRU STOMA, DIAGNOSTIC  4/5/2004    Diverticuli, o/w normal     HC REMOVE TONSILS/ADENOIDS,<11 Y/O  1946    T & A <13y.o.     HC REPAIR SLIDING INGUINAL HERNIA  1989    lt     HERNIORRHAPHY INGUINAL Right 11/10/2016    Procedure: HERNIORRHAPHY INGUINAL;  Surgeon: Julio Lund MD;  Location:  OR     SURGICAL HISTORY OF -   1991    lt shoulder Rotator cuff repair     Current Outpatient Prescriptions   Medication Sig Dispense Refill     aspirin 81 MG tablet Take 81 mg by mouth daily       finasteride (PROSCAR) 5 MG tablet Take 1 tablet (5 mg) by mouth daily 90 tablet 3     ibuprofen (ADVIL,MOTRIN) 200 MG tablet Take 400-600 mg by mouth every 8 hours as needed 3 at night       MULTI-VITAMIN OR TABS 1 tab daily  0     omeprazole (PRILOSEC) 20 MG CR capsule Take 1 capsule (20 mg) by mouth daily 90 capsule 3     ondansetron (ZOFRAN ODT) 4 MG ODT tab Take 1 tablet (4 mg) by mouth every 8 hours as needed for nausea or vomiting 6 tablet 0     sildenafil (VIAGRA) 100 MG tablet Take 1 tablet (100 mg) by mouth daily as needed 30 min to 4 hrs before sex. 6 tablet 11     OTC products: None, except as noted above    No Known Allergies   Latex Allergy: NO    Social History   Substance Use Topics     Smoking status: Former  "Smoker     Packs/day: 0.50     Years: 20.00     Types: Cigarettes     Quit date: 9/22/1984     Smokeless tobacco: Never Used     Alcohol use 0.0 oz/week     0 Standard drinks or equivalent per week      Comment: About two drinks weekly     History   Drug Use No     REVIEW OF SYSTEMS:   CONSTITUTIONAL: NEGATIVE for fever, chills, change in weight  INTEGUMENTARY/SKIN: NEGATIVE for worrisome rashes, moles or lesions  EYES: NEGATIVE for vision changes or irritation  ENT/MOUTH: NEGATIVE for ear, mouth and throat problems  RESP: NEGATIVE for significant cough or SOB  BREAST: NEGATIVE for masses, tenderness or discharge  CV: NEGATIVE for chest pain, palpitations or peripheral edema  GI: NEGATIVE for nausea, abdominal pain, heartburn, or change in bowel habits  : NEGATIVE for frequency, dysuria, or hematuria  MUSCULOSKELETAL: NEGATIVE for significant arthralgias or myalgia  NEURO: NEGATIVE for weakness, dizziness or paresthesias  ENDOCRINE: NEGATIVE for temperature intolerance, skin/hair changes  HEME: NEGATIVE for bleeding problems  PSYCHIATRIC: NEGATIVE for changes in mood or affect    This document serves as a record of the services and decisions personally performed and made by Michael Ni MD. It was created on his behalf by Nilsa Ricardo, a trained medical scribe. The creation of this document is based on the provider's statements to the medical scribe.  Nilsa Ricardo November 21, 2018 11:39 AM     EXAM:   /68 (BP Location: Left arm, Patient Position: Sitting, Cuff Size: Adult Large)  Pulse 87  Temp 97.6  F (36.4  C) (Oral)  Resp 16  Ht 1.854 m (6' 1\")  Wt 88.9 kg (196 lb)  SpO2 99%  BMI 25.86 kg/m2       GENERAL APPEARANCE: healthy, alert and no distress     EYES: EOMI,  PERRL     HENT: ear canals and TM's normal and nose and mouth without ulcers or lesions     NECK: no adenopathy, no asymmetry, masses, or scars and thyroid normal to palpation     RESP: lungs clear to auscultation - no rales, " rhonchi or wheezes     CV: regular rates and rhythm, normal S1 S2, no S3 or S4 and no murmur, click or rub     ABDOMEN:  soft, nontender, no HSM or masses and bowel sounds normal     MS: extremities normal- no gross deformities noted, no evidence of inflammation in joints, FROM in all extremities.     SKIN: no suspicious lesions or rashes     NEURO: Normal strength and tone, sensory exam grossly normal, mentation intact and speech normal     PSYCH: mentation appears normal. and affect normal/bright     LYMPHATICS: No cervical adenopathy    DIAGNOSTICS:   No labs or EKG required for low risk surgery (cataract, skin procedure, breast biopsy, etc)    Recent Labs   Lab Test  11/18/18 2033 09/12/18   0934   HGB  14.0  13.7   PLT  181  152   NA  136  140   POTASSIUM  3.6  4.5   CR  1.01  0.92   A1C   --   5.8*     IMPRESSION:   Reason for surgery/procedure: cataract, right eye  Diagnosis/reason for consult: Clearance for surgery    The proposed surgical procedure is considered LOW risk.    REVISED CARDIAC RISK INDEX  The patient has the following serious cardiovascular risks for perioperative complications such as (MI, PE, VFib and 3  AV Block):  No serious cardiac risks  INTERPRETATION: 0 risks: Class I (very low risk - 0.4% complication rate)    The patient has the following additional risks for perioperative complications:  No identified additional risks      ICD-10-CM    1. Preop general physical exam Z01.818    2. Cataract of right eye, unspecified cataract type H26.9    3. Small bowel obstruction (H) K56.609    4. Right inguinal hernia K40.90      RECOMMENDATIONS:     --Patient is to take all scheduled medications on the day of surgery EXCEPT for modifications listed below.    APPROVAL GIVEN to proceed with proposed procedure, without further diagnostic evaluation      Okay to wait until after surgery to take your daily medications.     Okay to go ahead with cataract surgery this Tuesday morning as planned.      Agree with seeing the general surgeon to see what they recommend regarding the right inguinal hernia. (It is possible that today's pre-op could qualify for a hernia repair preop, depending upon the timing, if surgery is done).  The information in this document, created by the medical scribe for me, accurately reflects the services I personally performed and the decisions made by me. I have reviewed and approved this document for accuracy prior to leaving the patient care area.  November 21, 2018 11:50 AM    Signed Electronically by: Michael Ni MD    Copy of this evaluation report is provided to requesting physician.    [place] Preop Guidelines    Revised Cardiac Risk Index

## 2018-12-07 ENCOUNTER — ANESTHESIA EVENT (OUTPATIENT)
Dept: SURGERY | Facility: CLINIC | Age: 78
End: 2018-12-07
Payer: MEDICARE

## 2018-12-07 ENCOUNTER — APPOINTMENT (OUTPATIENT)
Dept: SURGERY | Facility: PHYSICIAN GROUP | Age: 78
End: 2018-12-07
Payer: MEDICARE

## 2018-12-07 ENCOUNTER — HOSPITAL ENCOUNTER (OUTPATIENT)
Facility: CLINIC | Age: 78
Discharge: HOME | End: 2018-12-07
Attending: SURGERY | Admitting: SURGERY
Payer: MEDICARE

## 2018-12-07 ENCOUNTER — ANESTHESIA (OUTPATIENT)
Dept: SURGERY | Facility: CLINIC | Age: 78
End: 2018-12-07
Payer: MEDICARE

## 2018-12-07 VITALS
RESPIRATION RATE: 14 BRPM | OXYGEN SATURATION: 99 % | BODY MASS INDEX: 25.84 KG/M2 | HEART RATE: 65 BPM | WEIGHT: 195 LBS | TEMPERATURE: 98.2 F | SYSTOLIC BLOOD PRESSURE: 139 MMHG | DIASTOLIC BLOOD PRESSURE: 87 MMHG | HEIGHT: 73 IN

## 2018-12-07 DIAGNOSIS — K40.91 UNILATERAL RECURRENT INGUINAL HERNIA WITHOUT OBSTRUCTION OR GANGRENE: Primary | ICD-10-CM

## 2018-12-07 PROCEDURE — 36000085 ZZH SURGERY LEVEL 8 1ST 30 MIN: Performed by: SURGERY

## 2018-12-07 PROCEDURE — 37000009 ZZH ANESTHESIA TECHNICAL FEE, EACH ADDTL 15 MIN: Performed by: SURGERY

## 2018-12-07 PROCEDURE — 25000132 ZZH RX MED GY IP 250 OP 250 PS 637: Mod: GY | Performed by: SURGERY

## 2018-12-07 PROCEDURE — 25000128 H RX IP 250 OP 636: Performed by: NURSE ANESTHETIST, CERTIFIED REGISTERED

## 2018-12-07 PROCEDURE — C1781 MESH (IMPLANTABLE): HCPCS | Performed by: SURGERY

## 2018-12-07 PROCEDURE — 25000128 H RX IP 250 OP 636: Performed by: SURGERY

## 2018-12-07 PROCEDURE — 25000125 ZZHC RX 250: Performed by: NURSE ANESTHETIST, CERTIFIED REGISTERED

## 2018-12-07 PROCEDURE — 49651 LAP ING HERNIA REPAIR RECUR: CPT | Mod: RT | Performed by: SURGERY

## 2018-12-07 PROCEDURE — 37000008 ZZH ANESTHESIA TECHNICAL FEE, 1ST 30 MIN: Performed by: SURGERY

## 2018-12-07 PROCEDURE — 25000128 H RX IP 250 OP 636: Performed by: ANESTHESIOLOGY

## 2018-12-07 PROCEDURE — 36000087 ZZH SURGERY LEVEL 8 EA 15 ADDTL MIN: Performed by: SURGERY

## 2018-12-07 PROCEDURE — 27210794 ZZH OR GENERAL SUPPLY STERILE: Performed by: SURGERY

## 2018-12-07 PROCEDURE — 49651 LAP ING HERNIA REPAIR RECUR: CPT | Mod: AS | Performed by: PHYSICIAN ASSISTANT

## 2018-12-07 PROCEDURE — 71000027 ZZH RECOVERY PHASE 2 EACH 15 MINS: Performed by: SURGERY

## 2018-12-07 PROCEDURE — 71000012 ZZH RECOVERY PHASE 1 LEVEL 1 FIRST HR: Performed by: SURGERY

## 2018-12-07 PROCEDURE — S2900 ROBOTIC SURGICAL SYSTEM: HCPCS | Performed by: SURGERY

## 2018-12-07 PROCEDURE — 40000306 ZZH STATISTIC PRE PROC ASSESS II: Performed by: SURGERY

## 2018-12-07 PROCEDURE — 25000566 ZZH SEVOFLURANE, EA 15 MIN: Performed by: SURGERY

## 2018-12-07 DEVICE — MESH PROGRIP LAPAROSCOPIC 5.9X3.9" PARIETEX SELF-FIX LPG1510: Type: IMPLANTABLE DEVICE | Status: FUNCTIONAL

## 2018-12-07 RX ORDER — ALBUTEROL SULFATE 0.83 MG/ML
2.5 SOLUTION RESPIRATORY (INHALATION) EVERY 4 HOURS PRN
Status: DISCONTINUED | OUTPATIENT
Start: 2018-12-07 | End: 2018-12-07 | Stop reason: HOSPADM

## 2018-12-07 RX ORDER — ONDANSETRON 2 MG/ML
INJECTION INTRAMUSCULAR; INTRAVENOUS PRN
Status: DISCONTINUED | OUTPATIENT
Start: 2018-12-07 | End: 2018-12-07

## 2018-12-07 RX ORDER — LIDOCAINE HYDROCHLORIDE 10 MG/ML
INJECTION, SOLUTION INFILTRATION; PERINEURAL PRN
Status: DISCONTINUED | OUTPATIENT
Start: 2018-12-07 | End: 2018-12-07

## 2018-12-07 RX ORDER — NEOSTIGMINE METHYLSULFATE 1 MG/ML
VIAL (ML) INJECTION PRN
Status: DISCONTINUED | OUTPATIENT
Start: 2018-12-07 | End: 2018-12-07

## 2018-12-07 RX ORDER — NALOXONE HYDROCHLORIDE 0.4 MG/ML
.1-.4 INJECTION, SOLUTION INTRAMUSCULAR; INTRAVENOUS; SUBCUTANEOUS
Status: DISCONTINUED | OUTPATIENT
Start: 2018-12-07 | End: 2018-12-07 | Stop reason: HOSPADM

## 2018-12-07 RX ORDER — SODIUM CHLORIDE, SODIUM LACTATE, POTASSIUM CHLORIDE, CALCIUM CHLORIDE 600; 310; 30; 20 MG/100ML; MG/100ML; MG/100ML; MG/100ML
INJECTION, SOLUTION INTRAVENOUS CONTINUOUS
Status: DISCONTINUED | OUTPATIENT
Start: 2018-12-07 | End: 2018-12-07 | Stop reason: HOSPADM

## 2018-12-07 RX ORDER — HYDROCODONE BITARTRATE AND ACETAMINOPHEN 5; 325 MG/1; MG/1
1-2 TABLET ORAL EVERY 4 HOURS PRN
Qty: 15 TABLET | Refills: 0 | Status: SHIPPED | OUTPATIENT
Start: 2018-12-07 | End: 2019-01-25

## 2018-12-07 RX ORDER — KETOROLAC TROMETHAMINE 30 MG/ML
INJECTION, SOLUTION INTRAMUSCULAR; INTRAVENOUS PRN
Status: DISCONTINUED | OUTPATIENT
Start: 2018-12-07 | End: 2018-12-07

## 2018-12-07 RX ORDER — BUPIVACAINE HYDROCHLORIDE 5 MG/ML
INJECTION, SOLUTION PERINEURAL PRN
Status: DISCONTINUED | OUTPATIENT
Start: 2018-12-07 | End: 2018-12-07 | Stop reason: HOSPADM

## 2018-12-07 RX ORDER — TAMSULOSIN HYDROCHLORIDE 0.4 MG/1
0.4 CAPSULE ORAL
Status: COMPLETED | OUTPATIENT
Start: 2018-12-07 | End: 2018-12-07

## 2018-12-07 RX ORDER — DIAZEPAM 10 MG/2ML
2.5 INJECTION, SOLUTION INTRAMUSCULAR; INTRAVENOUS
Status: DISCONTINUED | OUTPATIENT
Start: 2018-12-07 | End: 2018-12-07 | Stop reason: HOSPADM

## 2018-12-07 RX ORDER — CEFAZOLIN SODIUM 2 G/100ML
2 INJECTION, SOLUTION INTRAVENOUS
Status: COMPLETED | OUTPATIENT
Start: 2018-12-07 | End: 2018-12-07

## 2018-12-07 RX ORDER — DIMENHYDRINATE 50 MG/ML
25 INJECTION, SOLUTION INTRAMUSCULAR; INTRAVENOUS
Status: DISCONTINUED | OUTPATIENT
Start: 2018-12-07 | End: 2018-12-07 | Stop reason: HOSPADM

## 2018-12-07 RX ORDER — HYDROMORPHONE HYDROCHLORIDE 1 MG/ML
.3-.5 INJECTION, SOLUTION INTRAMUSCULAR; INTRAVENOUS; SUBCUTANEOUS EVERY 5 MIN PRN
Status: DISCONTINUED | OUTPATIENT
Start: 2018-12-07 | End: 2018-12-07 | Stop reason: HOSPADM

## 2018-12-07 RX ORDER — DEXAMETHASONE SODIUM PHOSPHATE 4 MG/ML
INJECTION, SOLUTION INTRA-ARTICULAR; INTRALESIONAL; INTRAMUSCULAR; INTRAVENOUS; SOFT TISSUE PRN
Status: DISCONTINUED | OUTPATIENT
Start: 2018-12-07 | End: 2018-12-07

## 2018-12-07 RX ORDER — ONDANSETRON 4 MG/1
4 TABLET, ORALLY DISINTEGRATING ORAL EVERY 30 MIN PRN
Status: DISCONTINUED | OUTPATIENT
Start: 2018-12-07 | End: 2018-12-07 | Stop reason: HOSPADM

## 2018-12-07 RX ORDER — MEPERIDINE HYDROCHLORIDE 50 MG/ML
12.5 INJECTION INTRAMUSCULAR; INTRAVENOUS; SUBCUTANEOUS EVERY 5 MIN PRN
Status: DISCONTINUED | OUTPATIENT
Start: 2018-12-07 | End: 2018-12-07 | Stop reason: HOSPADM

## 2018-12-07 RX ORDER — HYDROCODONE BITARTRATE AND ACETAMINOPHEN 5; 325 MG/1; MG/1
1 TABLET ORAL
Status: DISCONTINUED | OUTPATIENT
Start: 2018-12-07 | End: 2018-12-07 | Stop reason: HOSPADM

## 2018-12-07 RX ORDER — ONDANSETRON 2 MG/ML
4 INJECTION INTRAMUSCULAR; INTRAVENOUS EVERY 30 MIN PRN
Status: DISCONTINUED | OUTPATIENT
Start: 2018-12-07 | End: 2018-12-07 | Stop reason: HOSPADM

## 2018-12-07 RX ORDER — FENTANYL CITRATE 50 UG/ML
INJECTION, SOLUTION INTRAMUSCULAR; INTRAVENOUS PRN
Status: DISCONTINUED | OUTPATIENT
Start: 2018-12-07 | End: 2018-12-07

## 2018-12-07 RX ORDER — GLYCOPYRROLATE 0.2 MG/ML
INJECTION, SOLUTION INTRAMUSCULAR; INTRAVENOUS PRN
Status: DISCONTINUED | OUTPATIENT
Start: 2018-12-07 | End: 2018-12-07

## 2018-12-07 RX ORDER — LABETALOL HYDROCHLORIDE 5 MG/ML
10 INJECTION, SOLUTION INTRAVENOUS
Status: DISCONTINUED | OUTPATIENT
Start: 2018-12-07 | End: 2018-12-07 | Stop reason: HOSPADM

## 2018-12-07 RX ORDER — LIDOCAINE 40 MG/G
CREAM TOPICAL
Status: DISCONTINUED | OUTPATIENT
Start: 2018-12-07 | End: 2018-12-07 | Stop reason: HOSPADM

## 2018-12-07 RX ORDER — CEFAZOLIN SODIUM 1 G/3ML
1 INJECTION, POWDER, FOR SOLUTION INTRAMUSCULAR; INTRAVENOUS SEE ADMIN INSTRUCTIONS
Status: DISCONTINUED | OUTPATIENT
Start: 2018-12-07 | End: 2018-12-07 | Stop reason: HOSPADM

## 2018-12-07 RX ORDER — FENTANYL CITRATE 50 UG/ML
25-50 INJECTION, SOLUTION INTRAMUSCULAR; INTRAVENOUS
Status: DISCONTINUED | OUTPATIENT
Start: 2018-12-07 | End: 2018-12-07 | Stop reason: HOSPADM

## 2018-12-07 RX ADMIN — Medication 2 MG: at 10:51

## 2018-12-07 RX ADMIN — HYDROMORPHONE HYDROCHLORIDE 1 MG: 1 INJECTION, SOLUTION INTRAMUSCULAR; INTRAVENOUS; SUBCUTANEOUS at 09:02

## 2018-12-07 RX ADMIN — ONDANSETRON 4 MG: 2 INJECTION INTRAMUSCULAR; INTRAVENOUS at 09:02

## 2018-12-07 RX ADMIN — KETOROLAC TROMETHAMINE 30 MG: 30 INJECTION, SOLUTION INTRAMUSCULAR at 09:02

## 2018-12-07 RX ADMIN — LIDOCAINE HYDROCHLORIDE 50 MG: 10 INJECTION, SOLUTION INFILTRATION; PERINEURAL at 09:02

## 2018-12-07 RX ADMIN — CEFAZOLIN SODIUM 2 G: 2 INJECTION, SOLUTION INTRAVENOUS at 09:08

## 2018-12-07 RX ADMIN — GLYCOPYRROLATE 0.4 MG: 0.2 INJECTION, SOLUTION INTRAMUSCULAR; INTRAVENOUS at 10:51

## 2018-12-07 RX ADMIN — ROCURONIUM BROMIDE 50 MG: 10 INJECTION INTRAVENOUS at 09:02

## 2018-12-07 RX ADMIN — PHENYLEPHRINE HYDROCHLORIDE 50 MCG: 10 INJECTION, SOLUTION INTRAMUSCULAR; INTRAVENOUS; SUBCUTANEOUS at 09:18

## 2018-12-07 RX ADMIN — SODIUM CHLORIDE, POTASSIUM CHLORIDE, SODIUM LACTATE AND CALCIUM CHLORIDE: 600; 310; 30; 20 INJECTION, SOLUTION INTRAVENOUS at 08:00

## 2018-12-07 RX ADMIN — DEXAMETHASONE SODIUM PHOSPHATE 4 MG: 4 INJECTION, SOLUTION INTRA-ARTICULAR; INTRALESIONAL; INTRAMUSCULAR; INTRAVENOUS; SOFT TISSUE at 09:02

## 2018-12-07 RX ADMIN — FENTANYL CITRATE 50 MCG: 50 INJECTION, SOLUTION INTRAMUSCULAR; INTRAVENOUS at 09:02

## 2018-12-07 RX ADMIN — GLYCOPYRROLATE 0.2 MG: 0.2 INJECTION, SOLUTION INTRAMUSCULAR; INTRAVENOUS at 09:02

## 2018-12-07 RX ADMIN — SODIUM CHLORIDE, POTASSIUM CHLORIDE, SODIUM LACTATE AND CALCIUM CHLORIDE: 600; 310; 30; 20 INJECTION, SOLUTION INTRAVENOUS at 09:36

## 2018-12-07 RX ADMIN — TAMSULOSIN HYDROCHLORIDE 0.4 MG: 0.4 CAPSULE ORAL at 14:48

## 2018-12-07 NOTE — DISCHARGE INSTRUCTIONS
You received Toradol, an IV form of ibuprofen (Motrin) at 9:05am.  Do not take any ibuprofen products until 3:05pm.    Maximum acetaminophen (Tylenol) dose from all sources should not exceed 4 grams (4000 mg) per day. Each Norco tablet contains 325mg of Tylenol.              GENERAL ANESTHESIA OR SEDATION ADULT DISCHARGE INSTRUCTIONS   SPECIAL PRECAUTIONS FOR 24 HOURS AFTER SURGERY    IT IS NOT UNUSUAL TO FEEL LIGHT-HEADED OR FAINT, UP TO 24 HOURS AFTER SURGERY OR WHILE TAKING PAIN MEDICATION.  IF YOU HAVE THESE SYMPTOMS; SIT FOR A FEW MINUTES BEFORE STANDING AND HAVE SOMEONE ASSIST YOU WHEN YOU GET UP TO WALK OR USE THE BATHROOM.    YOU SHOULD REST AND RELAX FOR THE NEXT 24 HOURS AND YOU MUST MAKE ARRANGEMENTS TO HAVE SOMEONE STAY WITH YOU FOR AT LEAST 24 HOURS AFTER YOUR DISCHARGE.  AVOID HAZARDOUS AND STRENUOUS ACTIVITIES.  DO NOT MAKE IMPORTANT DECISIONS FOR 24 HOURS.    DO NOT DRIVE ANY VEHICLE OR OPERATE MECHANICAL EQUIPMENT FOR 24 HOURS FOLLOWING THE END OF YOUR SURGERY.  EVEN THOUGH YOU MAY FEEL NORMAL, YOUR REACTIONS MAY BE AFFECTED BY THE MEDICATION YOU HAVE RECEIVED.    DO NOT DRINK ALCOHOLIC BEVERAGES FOR 24 HOURS FOLLOWING YOUR SURGERY.    DRINK CLEAR LIQUIDS (APPLE JUICE, GINGER ALE, 7-UP, BROTH, ETC.).  PROGRESS TO YOUR REGULAR DIET AS YOU FEEL ABLE.    YOU MAY HAVE A DRY MOUTH, A SORE THROAT, MUSCLES ACHES OR TROUBLE SLEEPING.  THESE SHOULD GO AWAY AFTER 24 HOURS.    CALL YOUR DOCTOR FOR ANY OF THE FOLLOWING:  SIGNS OF INFECTION (FEVER, GROWING TENDERNESS AT THE SURGERY SITE, A LARGE AMOUNT OF DRAINAGE OR BLEEDING, SEVERE PAIN, FOUL-SMELLING DRAINAGE, REDNESS OR SWELLING.    IT HAS BEEN OVER 8 TO 10 HOURS SINCE SURGERY AND YOU ARE STILL NOT ABLE TO URINATE (PASS WATER).         HOME CARE FOLLOWING INGUINAL/FEMORAL HERNIA REPAIR  MATI Dasilva, MANDY Chi R. O Donnell, J. Shaheen    DIET:  No restrictions.  Increased fluid intake is recommended. While taking pain  medications, increase dietary fiber or add a fiber supplementation like Metamucil or Citrucel to help prevent constipation - a possible side effect of pain medications.    NAUSEA:  If nauseated from the anesthetic/pain meds; rest in bed, get up cautiously with assistance, and drink clear liquids (juice, tea, broth).    ACTIVITY:  Light Activity -- you may immediately be up and about as tolerated.  Driving -- you may drive when comfortable and off narcotic pain medications.  Light Work -- resume when comfortable off pain medications.  (If you can drive, you probably can work.)  Strenuous Work/Activity -- limit lifting to 20 pounds for 3 weeks.  Active Sports (running, biking, etc.) -- cautiously resume after 4 weeks.    INCISIONAL CARE:    If you have a dressing in place, keep clean and dry for 48 hours; you may replace the gauze if it becomes soiled.    After 48 hours you may remove the dressing and shower.  Do not submerse incision in water for 1 week.    If you have a Dermabond dressing (a type of skin glue), you may shower immediately.    Sutures will absorb and need not be removed.    If present, leave the steri-strips (white paper tapes) in place for 14 days after surgery.    If present, leave Dermabond glue in place until it wears/flakes off.    Expect a variable amount of swelling/black and blue discoloration that may involve the penis/scrotum or labia.    Some numbness around the incision is common.    A lump/ridge under the incision is normal and will gradually resolve.    DISCOMFORT:  Local anesthetic placed at surgery should provide relief for 4-8 hours.  Begin taking pain pills before discomfort is severe.  Take the pain medication with some food, when possible, to minimize side effects.  Intermittent use of ice packs to the hernia repair site may help during the first 1-3 weeks after surgery.  Expect gradual improvement.    Over-the-counter anti-inflammatory medications (i.e. Ibuprofen/Advil/Motrin or  Naprosyn/Aleve) may be used per package instructions in addition to or while tapering off the narcotic pain medications to decrease swelling and sensitivity at the repair site.  DO NOT TAKE these Anti-inflammatory medications if your primary physician has advised against doing so, or if you have acid reflux, ulcer, or bleeding disorder, or take blood-thinner medications.  Call your primary physician or the surgery office if you have medication questions.    RETURN APPOINTMENT:  Schedule a follow-up visit 2-3 weeks post-op.  Office Phone:  [phone]     CONTACT US IF THE FOLLOWING DEVELOPS:   1. A fever that is above 101     2. If there is a large amount of drainage, bleeding, or swelling.   3. Severe pain that is not relieved by your prescription.   4. Drainage that is thick, cloudy, yellow, green or white.   5. Any other questions not answered by  Frequently Asked Questions  sheet.      FREQUENTLY ASKED QUESTIONS:    Q:  How should my incision look?    A:  Normally your incision will appear slightly swollen with light redness directly along the incision itself as it heals.  It may feel like a bump or ridge as the healing/scarring happens, and over time (3-4 months) this bump or ridge feeling should slowly go away.  In general, clear or pink watery drainage can be normal at first as your incision heals, but should decrease over time.    Q:  How do I know if my incision is infected?  A:  Look at your incision for signs of infection, like redness around the incision spreading to surrounding skin, or drainage of cloudy or foul-smelling drainage.  If you feel warm, check your temperature to see if you are running a fever.    **If any of these things occur, please notify the nurse at our office.  We may need you to come into the office for an incision check.      Q:  How do I take care of my incision?  A:  If you have a dressing in place - Starting the day after surgery, replace the dressing 1-2 times a day until  there is no further drainage from the incision.  At that time, a dressing is no longer needed.  Try to minimize tape on the skin if irritation is occurring at the tape sites.  If you have significant irritation from tape on the skin, please call the office to discuss other method of dressing your incision.    Small pieces of tape called  steri-strips  may be present directly overlying your incision; these may be removed 10 days after surgery unless otherwise specified by your surgeon.  If these tapes start to loosen at the ends, you may trim them back until they fall off or are removed.    A:  If you had  Dermabond  tissue glue used as a dressing (this causes your incision to look shiny with a clear covering over it) - This type of dressing wears off with time and does not require more dressings over the top unless it is draining around the glue as it wears off.  Do not apply ointments or lotions over the incisions until the glue has completely worn off.    Q:  There is a piece of tape or a sticky  lead  still on my skin.  Can I remove this?  A:  Sometimes the sticky  leads  used for monitoring during surgery or for evaluation in the emergency department are not all removed while you are in the hospital.  These sometimes have a tab or metal dot on them.  You can easily remove these on your own, like taking off a band-aid.  If there is a gel substance under the  lead , simply wipe/clean it off with a washcloth or paper towel.      Q:  What can I do to minimize constipation (very hard stools, or lack of stools)?  A:  Stay well hydrated.  Increase your dietary fiber intake or take a fiber supplement -with plenty of water.  Walk around frequently.  You may consider an over-the-counter stool-softener.  Your Pharmacist can assist you with choosing one that is stocked at your pharmacy.  Constipation is also one of the most common side effects of pain medication.  If you are using pain medication, be pro-active and try to  PREVENT problems with constipation by taking the steps above BEFORE constipation becomes a problem.    Q:  What do I do if I need more pain medications?  A:  Call the office to receive refills.  Be aware that certain pain meds cannot be called into a pharmacy and actually require a paper prescription.  A change may be made in your pain med as you progress thru your recovery period or if you have side effects to certain meds.    --Pain meds are NOT refilled after 5pm on weekdays, and NOT AT ALL on the weekends, so please look ahead to prevent problems.      Q:  Why am I having a hard time sleeping now that I am at home?  A:  Many medications you receive while you are in the hospital can impact your sleep for a number of days after your surgery/hospitalization.  Decreased level of activity and naps during the day may also make sleeping at night difficult.  Try to minimize day-time naps, and get up frequently during the day to walk around your home during your recovery time.  Sleep aides may be of some help, but are not recommended for long-term use.      Q:  I am having some back discomfort.  What should I do?  A:  This may be related to certain positioning that was required for your surgery, extended periods of time in bed, or other changes in your overall activity level.  You may try ice, heat, acetaminophen, or ibuprofen to treat this temporarily.  Note that many pain medications have acetaminophen in them and would state this on the prescription bottle.  Be sure not to exceed the maximum of 4000mg per day of acetaminophen.     **If the pain you are having does not resolve, is severe, or is a flare of back pain you have had on other occasions prior to surgery, please contact your primary physician for further recommendations or for an appointment to be examined at their office.    Q:  Why am I having headaches?  A:  Headaches can be caused by many things:  caffeine withdrawal, use of pain meds, dehydration, high  blood pressure, lack of sleep, over-activity/exhaustion, flare-up of usual migraine headaches.  If you feel this is related to muscle tension (a band-like feeling around the head, or a pressure at the low-back of the head) you may try ice or heat to this area.  You may need to drink more fluids (try electrolyte drink like Gatorade), rest, or take your usual migraine medications.   **If your headaches do not resolve, worsen, are accompanied by other symptoms, or if your blood pressure is high, please call your primary physician for recommendation and/or examination.    Q:  I am unable to urinate.  What do I do?  A:  A small percentage of people can have difficulty urinating initially after surgery.  This includes being able to urinate only a very small amount at a time and feeling discomfort or pressure in the very low abdomen.  This is called  urinary retention , and is actually an urgent situation.  Proceed to your nearest Emergency department for evaluation (not an Urgent Care Center).  Sometimes the bladder does not work correctly after certain medications you receive during surgery, or related to certain procedures.  You may need to have a catheter placed until your bladder recovers.  When planning to go to an Emergency department, it may help to call the ER to let them know you are coming in for this problem after a surgery.  This may help you get in quicker to be evaluated.  **If you have symptoms of a urinary tract infection, please contact your primary physician for the proper evaluation and treatment.          If you have other questions, please call the office Monday thru Friday between 8am and 5pm to discuss with the nurse or physician assistant.  [phone]    There is a surgeon ON CALL on weekday evenings and over the weekend in case of urgent need only, and may be contacted at the same number.    If you are having an emergency, call 911 or proceed to your nearest emergency department.

## 2018-12-07 NOTE — IP AVS SNAPSHOT
MRN:[id number]                      After Visit Summary   12/7/2018    Jack Bro    MRN: [id number]           Thank you!     Thank you for choosing [facility] for your care. Our goal is always to provide you with excellent care. Hearing back from our patients is one way we can continue to improve our services. Please take a few minutes to complete the written survey that you may receive in the mail after you visit. If you would like to speak to someone directly about your visit please contact Patient Relations at [phone]. Thank you!          Patient Information     Date Of Birth          1940        About your hospital stay     You were admitted on:  December 7, 2018 You last received care in the:  [facility] PreOP/PostOP    You were discharged on:  December 7, 2018       Who to Call     For medical emergencies, please call 911.  For non-urgent questions about your medical care, please call your primary care provider or clinic, [phone]  For questions related to your surgery, please call your surgery clinic        Attending Provider     Provider Specialty    Beni Galicia MD General Surgery       Primary Care Provider Office Phone # Fax #    Michael Ni [phone]      Further instructions from your care team             You received Toradol, an IV form of ibuprofen (Motrin) at 9:05am.  Do not take any ibuprofen products until 3:05pm.    Maximum acetaminophen (Tylenol) dose from all sources should not exceed 4 grams (4000 mg) per day. Each Norco tablet contains 325mg of Tylenol.              GENERAL ANESTHESIA OR SEDATION ADULT DISCHARGE INSTRUCTIONS   SPECIAL PRECAUTIONS FOR 24 HOURS AFTER SURGERY    IT IS NOT UNUSUAL TO FEEL LIGHT-HEADED OR FAINT, UP TO 24 HOURS AFTER SURGERY OR WHILE TAKING PAIN MEDICATION.  IF YOU HAVE THESE SYMPTOMS; SIT FOR A FEW MINUTES BEFORE STANDING AND HAVE SOMEONE ASSIST YOU WHEN YOU GET UP TO WALK  OR USE THE BATHROOM.    YOU SHOULD REST AND RELAX FOR THE NEXT 24 HOURS AND YOU MUST MAKE ARRANGEMENTS TO HAVE SOMEONE STAY WITH YOU FOR AT LEAST 24 HOURS AFTER YOUR DISCHARGE.  AVOID HAZARDOUS AND STRENUOUS ACTIVITIES.  DO NOT MAKE IMPORTANT DECISIONS FOR 24 HOURS.    DO NOT DRIVE ANY VEHICLE OR OPERATE MECHANICAL EQUIPMENT FOR 24 HOURS FOLLOWING THE END OF YOUR SURGERY.  EVEN THOUGH YOU MAY FEEL NORMAL, YOUR REACTIONS MAY BE AFFECTED BY THE MEDICATION YOU HAVE RECEIVED.    DO NOT DRINK ALCOHOLIC BEVERAGES FOR 24 HOURS FOLLOWING YOUR SURGERY.    DRINK CLEAR LIQUIDS (APPLE JUICE, GINGER ALE, 7-UP, BROTH, ETC.).  PROGRESS TO YOUR REGULAR DIET AS YOU FEEL ABLE.    YOU MAY HAVE A DRY MOUTH, A SORE THROAT, MUSCLES ACHES OR TROUBLE SLEEPING.  THESE SHOULD GO AWAY AFTER 24 HOURS.    CALL YOUR DOCTOR FOR ANY OF THE FOLLOWING:  SIGNS OF INFECTION (FEVER, GROWING TENDERNESS AT THE SURGERY SITE, A LARGE AMOUNT OF DRAINAGE OR BLEEDING, SEVERE PAIN, FOUL-SMELLING DRAINAGE, REDNESS OR SWELLING.    IT HAS BEEN OVER 8 TO 10 HOURS SINCE SURGERY AND YOU ARE STILL NOT ABLE TO URINATE (PASS WATER).         HOME CARE FOLLOWING INGUINAL/FEMORAL HERNIA REPAIR  MATI Dasilva, MANDY Chi, BABAK Black, GEOFFREY Mackenzie    DIET:  No restrictions.  Increased fluid intake is recommended. While taking pain medications, increase dietary fiber or add a fiber supplementation like Metamucil or Citrucel to help prevent constipation - a possible side effect of pain medications.    NAUSEA:  If nauseated from the anesthetic/pain meds; rest in bed, get up cautiously with assistance, and drink clear liquids (juice, tea, broth).    ACTIVITY:  Light Activity -- you may immediately be up and about as tolerated.  Driving -- you may drive when comfortable and off narcotic pain medications.  Light Work -- resume when comfortable off pain medications.  (If you can drive, you probably can work.)  Strenuous Work/Activity -- limit lifting to 20  pounds for 3 weeks.  Active Sports (running, biking, etc.) -- cautiously resume after 4 weeks.    INCISIONAL CARE:    If you have a dressing in place, keep clean and dry for 48 hours; you may replace the gauze if it becomes soiled.    After 48 hours you may remove the dressing and shower.  Do not submerse incision in water for 1 week.    If you have a Dermabond dressing (a type of skin glue), you may shower immediately.    Sutures will absorb and need not be removed.    If present, leave the steri-strips (white paper tapes) in place for 14 days after surgery.    If present, leave Dermabond glue in place until it wears/flakes off.    Expect a variable amount of swelling/black and blue discoloration that may involve the penis/scrotum or labia.    Some numbness around the incision is common.    A lump/ridge under the incision is normal and will gradually resolve.    DISCOMFORT:  Local anesthetic placed at surgery should provide relief for 4-8 hours.  Begin taking pain pills before discomfort is severe.  Take the pain medication with some food, when possible, to minimize side effects.  Intermittent use of ice packs to the hernia repair site may help during the first 1-3 weeks after surgery.  Expect gradual improvement.    Over-the-counter anti-inflammatory medications (i.e. Ibuprofen/Advil/Motrin or Naprosyn/Aleve) may be used per package instructions in addition to or while tapering off the narcotic pain medications to decrease swelling and sensitivity at the repair site.  DO NOT TAKE these Anti-inflammatory medications if your primary physician has advised against doing so, or if you have acid reflux, ulcer, or bleeding disorder, or take blood-thinner medications.  Call your primary physician or the surgery office if you have medication questions.    RETURN APPOINTMENT:  Schedule a follow-up visit 2-3 weeks post-op.  Office Phone:  [phone]     CONTACT US IF THE FOLLOWING DEVELOPS:   1. A fever that is above 101      2. If there is a large amount of drainage, bleeding, or swelling.   3. Severe pain that is not relieved by your prescription.   4. Drainage that is thick, cloudy, yellow, green or white.   5. Any other questions not answered by  Frequently Asked Questions  sheet.      FREQUENTLY ASKED QUESTIONS:    Q:  How should my incision look?    A:  Normally your incision will appear slightly swollen with light redness directly along the incision itself as it heals.  It may feel like a bump or ridge as the healing/scarring happens, and over time (3-4 months) this bump or ridge feeling should slowly go away.  In general, clear or pink watery drainage can be normal at first as your incision heals, but should decrease over time.    Q:  How do I know if my incision is infected?  A:  Look at your incision for signs of infection, like redness around the incision spreading to surrounding skin, or drainage of cloudy or foul-smelling drainage.  If you feel warm, check your temperature to see if you are running a fever.    **If any of these things occur, please notify the nurse at our office.  We may need you to come into the office for an incision check.      Q:  How do I take care of my incision?  A:  If you have a dressing in place - Starting the day after surgery, replace the dressing 1-2 times a day until there is no further drainage from the incision.  At that time, a dressing is no longer needed.  Try to minimize tape on the skin if irritation is occurring at the tape sites.  If you have significant irritation from tape on the skin, please call the office to discuss other method of dressing your incision.    Small pieces of tape called  steri-strips  may be present directly overlying your incision; these may be removed 10 days after surgery unless otherwise specified by your surgeon.  If these tapes start to loosen at the ends, you may trim them back until they fall off or are removed.    A:  If you had  Dermabond  tissue glue  used as a dressing (this causes your incision to look shiny with a clear covering over it) - This type of dressing wears off with time and does not require more dressings over the top unless it is draining around the glue as it wears off.  Do not apply ointments or lotions over the incisions until the glue has completely worn off.    Q:  There is a piece of tape or a sticky  lead  still on my skin.  Can I remove this?  A:  Sometimes the sticky  leads  used for monitoring during surgery or for evaluation in the emergency department are not all removed while you are in the hospital.  These sometimes have a tab or metal dot on them.  You can easily remove these on your own, like taking off a band-aid.  If there is a gel substance under the  lead , simply wipe/clean it off with a washcloth or paper towel.      Q:  What can I do to minimize constipation (very hard stools, or lack of stools)?  A:  Stay well hydrated.  Increase your dietary fiber intake or take a fiber supplement -with plenty of water.  Walk around frequently.  You may consider an over-the-counter stool-softener.  Your Pharmacist can assist you with choosing one that is stocked at your pharmacy.  Constipation is also one of the most common side effects of pain medication.  If you are using pain medication, be pro-active and try to PREVENT problems with constipation by taking the steps above BEFORE constipation becomes a problem.    Q:  What do I do if I need more pain medications?  A:  Call the office to receive refills.  Be aware that certain pain meds cannot be called into a pharmacy and actually require a paper prescription.  A change may be made in your pain med as you progress thru your recovery period or if you have side effects to certain meds.    --Pain meds are NOT refilled after 5pm on weekdays, and NOT AT ALL on the weekends, so please look ahead to prevent problems.      Q:  Why am I having a hard time sleeping now that I am at home?  A:  Many  medications you receive while you are in the hospital can impact your sleep for a number of days after your surgery/hospitalization.  Decreased level of activity and naps during the day may also make sleeping at night difficult.  Try to minimize day-time naps, and get up frequently during the day to walk around your home during your recovery time.  Sleep aides may be of some help, but are not recommended for long-term use.      Q:  I am having some back discomfort.  What should I do?  A:  This may be related to certain positioning that was required for your surgery, extended periods of time in bed, or other changes in your overall activity level.  You may try ice, heat, acetaminophen, or ibuprofen to treat this temporarily.  Note that many pain medications have acetaminophen in them and would state this on the prescription bottle.  Be sure not to exceed the maximum of 4000mg per day of acetaminophen.     **If the pain you are having does not resolve, is severe, or is a flare of back pain you have had on other occasions prior to surgery, please contact your primary physician for further recommendations or for an appointment to be examined at their office.    Q:  Why am I having headaches?  A:  Headaches can be caused by many things:  caffeine withdrawal, use of pain meds, dehydration, high blood pressure, lack of sleep, over-activity/exhaustion, flare-up of usual migraine headaches.  If you feel this is related to muscle tension (a band-like feeling around the head, or a pressure at the low-back of the head) you may try ice or heat to this area.  You may need to drink more fluids (try electrolyte drink like Gatorade), rest, or take your usual migraine medications.   **If your headaches do not resolve, worsen, are accompanied by other symptoms, or if your blood pressure is high, please call your primary physician for recommendation and/or examination.    Q:  I am unable to urinate.  What do I do?  A:  A small  "percentage of people can have difficulty urinating initially after surgery.  This includes being able to urinate only a very small amount at a time and feeling discomfort or pressure in the very low abdomen.  This is called  urinary retention , and is actually an urgent situation.  Proceed to your nearest Emergency department for evaluation (not an Urgent Care Center).  Sometimes the bladder does not work correctly after certain medications you receive during surgery, or related to certain procedures.  You may need to have a catheter placed until your bladder recovers.  When planning to go to an Emergency department, it may help to call the ER to let them know you are coming in for this problem after a surgery.  This may help you get in quicker to be evaluated.  **If you have symptoms of a urinary tract infection, please contact your primary physician for the proper evaluation and treatment.          If you have other questions, please call the office Monday thru Friday between 8am and 5pm to discuss with the nurse or physician assistant.  #[phone]    There is a surgeon ON CALL on weekday evenings and over the weekend in case of urgent need only, and may be contacted at the same number.    If you are having an emergency, call 911 or proceed to your nearest emergency department.            Pending Results     No orders found from 12/5/2018 to 12/8/2018.            Admission Information     Date & Time Provider Department Dept. Phone    12/7/2018 Beni Galicia MD [facility] PreOP/PostOP [phone]      Your Vitals Were     Blood Pressure Pulse Temperature Respirations Height Weight    137/84 (BP Location: Right arm) 65 97.8  F (36.6  C) (Temporal) 16 1.854 m (6' 1\") 88.5 kg (195 lb)    Pulse Oximetry BMI (Body Mass Index)                97% 25.73 kg/m2          MyChart Information     [employer] gives you secure access to your electronic health record. If you see a primary care provider, you can " also send messages to your care team and make appointments. If you have questions, please call your primary care clinic.  If you do not have a primary care provider, please call [phone] and they will assist you.        Care EveryWhere ID     This is your Care EveryWhere ID. This could be used by other organizations to access your [facility] medical records  [id number]        Equal Access to Services     NASH BLACKMON : Hadii luis m arellano hadasho Soneldaali, waaxda luqadaha, qaybta kaalmada adecompayada, fransico parrishjuanmagdalena talamantes . So [facility] [phone].    ATENCIÓN: Si habla español, tiene a tolentino disposición servicios gratuitos de asistencia lingüística. Juan al [phone].    We comply with applicable federal civil rights laws and [place] laws. We do not discriminate on the basis of race, color, national origin, age, disability, sex, sexual orientation, or gender identity.               Review of your medicines      START taking        Dose / Directions    HYDROcodone-acetaminophen 5-325 MG tablet   Commonly known as:  NORCO   Used for:  Unilateral recurrent inguinal hernia without obstruction or gangrene        Dose:  1-2 tablet   Take 1-2 tablets by mouth every 4 hours as needed for moderate to severe pain   Quantity:  15 tablet   Refills:  0         CONTINUE these medicines which have NOT CHANGED        Dose / Directions    aspirin 81 MG tablet   Commonly known as:  ASA        Dose:  81 mg   Take 81 mg by mouth daily   Refills:  0       finasteride 5 MG tablet   Commonly known as:  PROSCAR   Used for:  Slowing of urinary stream        Dose:  1 tablet   Take 1 tablet (5 mg) by mouth daily   Quantity:  90 tablet   Refills:  3       ibuprofen 200 MG tablet   Commonly known as:  ADVIL/MOTRIN        Dose:  400-600 mg   Take 400-600 mg by mouth every 8 hours as needed 3 at night   Refills:  0       Multi-vitamin tablet   Generic drug:  multivitamin w/minerals        1 tab daily   Refills:  0       omeprazole  20 MG DR capsule   Commonly known as:  priLOSEC   Used for:  Gastroesophageal reflux disease without esophagitis        Dose:  20 mg   Take 1 capsule (20 mg) by mouth daily   Quantity:  90 capsule   Refills:  3       sildenafil 100 MG tablet   Commonly known as:  VIAGRA   Used for:  Erectile dysfunction, unspecified erectile dysfunction type        Dose:  100 mg   Take 1 tablet (100 mg) by mouth daily as needed 30 min to 4 hrs before sex.   Quantity:  6 tablet   Refills:  11            Where to get your medicines      Some of these will need a paper prescription and others can be bought over the counter. Ask your nurse if you have questions.     Bring a paper prescription for each of these medications     HYDROcodone-acetaminophen 5-325 MG tablet                Protect others around you: Learn how to safely use, store and throw away your medicines at www.disposemymeds.org.        Information about OPIOIDS     PRESCRIPTION OPIOIDS: WHAT YOU NEED TO KNOW   We gave you an opioid (narcotic) pain medicine. It is important to manage your pain, but opioids are not always the best choice. You should first try all the other options your care team gave you. Take this medicine for as short a time (and as few doses) as possible.    Some activities can increase your pain, such as bandage changes or therapy sessions. It may help to take your pain medicine 30 to 60 minutes before these activities. Reduce your stress by getting enough sleep, working on hobbies you enjoy and practicing relaxation or meditation. Talk to your care team about ways to manage your pain beyond prescription opioids.    These medicines have risks:    DO NOT drive when on new or higher doses of pain medicine. These medicines can affect your alertness and reaction times, and you could be arrested for driving under the influence (DUI). If you need to use opioids long-term, talk to your care team about driving.    DO NOT operate heavy machinery    DO NOT do any  other dangerous activities while taking these medicines.    DO NOT drink any alcohol while taking these medicines.     If the opioid prescribed includes acetaminophen, DO NOT take with any other medicines that contain acetaminophen. Read all labels carefully. Look for the word  acetaminophen  or  Tylenol.  Ask your pharmacist if you have questions or are unsure.    You can get addicted to pain medicines, especially if you have a history of addiction (chemical, alcohol or substance dependence). Talk to your care team about ways to reduce this risk.    All opioids tend to cause constipation. Drink plenty of water and eat foods that have a lot of fiber, such as fruits, vegetables, prune juice, apple juice and high-fiber cereal. Take a laxative (Miralax, milk of magnesia, Colace, Senna) if you don t move your bowels at least every other day. Other side effects include upset stomach, sleepiness, dizziness, throwing up, tolerance (needing more of the medicine to have the same effect), physical dependence and slowed breathing.    Store your pills in a secure place, locked if possible. We will not replace any lost or stolen medicine. If you don t finish your medicine, please throw away (dispose) as directed by your pharmacist. The [place] [employer]ution [employer] has more information about safe disposal: https://www.pca.[facility].[place].us/living-green/managing-unwanted-medications             Medication List: This is a list of all your medications and when to take them. Check marks below indicate your daily home schedule. Keep this list as a reference.      Medications           Morning Afternoon Evening Bedtime As Needed    aspirin 81 MG tablet   Commonly known as:  ASA   Take 81 mg by mouth daily                                finasteride 5 MG tablet   Commonly known as:  PROSCAR   Take 1 tablet (5 mg) by mouth daily                                HYDROcodone-acetaminophen 5-325 MG tablet   Commonly known as:  NORCO   Take 1-2  tablets by mouth every 4 hours as needed for moderate to severe pain                                ibuprofen 200 MG tablet   Commonly known as:  ADVIL/MOTRIN   Take 400-600 mg by mouth every 8 hours as needed 3 at night                                Multi-vitamin tablet   1 tab daily   Generic drug:  multivitamin w/minerals                                omeprazole 20 MG DR capsule   Commonly known as:  priLOSEC   Take 1 capsule (20 mg) by mouth daily                                sildenafil 100 MG tablet   Commonly known as:  VIAGRA   Take 1 tablet (100 mg) by mouth daily as needed 30 min to 4 hrs before sex.

## 2018-12-07 NOTE — ANESTHESIA PREPROCEDURE EVALUATION
PAC NOTE:       ANESTHESIA PRE EVALUATION:  Anesthesia Evaluation     . Pt has had prior anesthetic. Type: General    No history of anesthetic complications          ROS/MED HX    ENT/Pulmonary:  - neg pulmonary ROS     Neurologic:  - neg neurologic ROS     Cardiovascular:  - neg cardiovascular ROS       METS/Exercise Tolerance:     Hematologic:  - neg hematologic  ROS       Musculoskeletal:   (+) arthritis, , , -       GI/Hepatic:     (+) GERD Asymptomatic on medication, Other GI/Hepatic recurrent inguinal hernia      Renal/Genitourinary:     (+) BPH,       Endo:  - neg endo ROS       Psychiatric:  - neg psychiatric ROS       Infectious Disease:  - neg infectious disease ROS       Malignancy:      - no malignancy   Other:    - neg other ROS                 Physical Exam  Normal systems: cardiovascular, pulmonary and dental    Airway   Mallampati: II  TM distance: >3 FB  Neck ROM: full    Dental     Cardiovascular   Rhythm and rate: regular and normal      Pulmonary    breath sounds clear to auscultation    Other findings: Lab Test        11/18/18 09/12/18 09/05/17 2033          0934          0921          WBC          9.7          4.6          5.9           HGB          14.0         13.7         13.1*         MCV          88           88           87            PLT          181          152          133*           Lab Test        11/18/18 09/12/18 09/05/17 2033          0934          0921          NA           136          140          140           POTASSIUM    3.6          4.5          4.6           CHLORIDE     101          106          104           CO2          27           28           30            BUN          21           15           15            CR           1.01         0.92         0.96          ANIONGAP     8            6            6             ZOYA          9.4          9.0          9.3           GLC          163*         101*         102*                    ECG NSR with incomplete RBBB         Anesthesia Plan      History & Physical Review  History and physical reviewed and following examination; no interval change.    ASA Status:  2 .    NPO Status:  > 8 hours    Plan for General and ETT with Intravenous induction. Maintenance will be Balanced.    PONV prophylaxis:  Ondansetron (or other 5HT-3) and Dexamethasone or Solumedrol       Postoperative Care  Postoperative pain management:  IV analgesics, Oral pain medications and Multi-modal analgesia.      Consents  Anesthetic plan, risks, benefits and alternatives discussed with:  Patient.  Use of blood products discussed: No .   .                            .

## 2018-12-07 NOTE — ANESTHESIA CARE TRANSFER NOTE
Patient: Jack Bro    Procedure(s):  Robotic assisted laparoscopic repair of recurrent right inguinal hernia with mesh    Diagnosis: recurrent right inguinal hernia  Diagnosis Additional Information: No value filed.    Anesthesia Type:   General, ETT     Note:  Airway :Face Mask  Patient transferred to:PACU  Comments: Report and sign off to RN in PACU.  Good resps, skin pink, monitors on, VSS,  O2 via Face Tent.Handoff Report: Identifed the Patient, Identified the Reponsible Provider, Reviewed the pertinent medical history, Discussed the surgical course, Reviewed Intra-OP anesthesia mangement and issues during anesthesia, Set expectations for post-procedure period and Allowed opportunity for questions and acknowledgement of understanding      Vitals: (Last set prior to Anesthesia Care Transfer)    CRNA VITALS  12/7/2018 1029 - 12/7/2018 1106      12/7/2018             Pulse: 103    SpO2: 98 %    Resp Rate (observed): 10    EKG: NSR;PAC's                Electronically Signed By: MICHELLE Vogt CRNA  December 7, 2018  11:06 AM

## 2018-12-07 NOTE — BRIEF OP NOTE
[facility] Brief Operative Note    Pre-operative diagnosis: recurrent right inguinal hernia   Post-operative diagnosis Incarcerated recurrent right inguinal hernia   Procedure: Procedure(s):  Robotic assisted laparoscopic repair of recurrent right inguinal hernia with mesh   Surgeon(s): Surgeon(s) and Role:     * Beni Galicia MD - Primary     * Hiram Ceron PA-C   Estimated blood loss: * No values recorded between 12/7/2018  9:21 AM and 12/7/2018 10:57 AM *    Specimens: * No specimens in log *   Findings: Incarcerated direct defect containing sac and indurated fat. No indirect recurrence. Direct space covered with ProGrip mesh.

## 2018-12-07 NOTE — IP AVS SNAPSHOT
[facility] PreOP/PostOP    [address]    [facility] [address]    Phone:  [phone]    Fax:  [phone]                                       After Visit Summary   12/7/2018    Jack Bro    MRN: [id number]           After Visit Summary Signature Page     I have received my discharge instructions, and my questions have been answered. I have discussed any challenges I see with this plan with the nurse or doctor.    ..........................................................................................................................................  Patient/Patient Representative Signature      ..........................................................................................................................................  Patient Representative Print Name and Relationship to Patient    ..................................................               ................................................  Date                                   Time    ..........................................................................................................................................  Reviewed by Signature/Title    ...................................................              ..............................................  Date                                               Time          22EPIC Rev 08/18

## 2018-12-07 NOTE — ANESTHESIA POSTPROCEDURE EVALUATION
Patient: Jack Bro    Procedure(s):  Robotic assisted laparoscopic repair of recurrent right inguinal hernia with mesh    Diagnosis:recurrent right inguinal hernia  Diagnosis Additional Information: Incarcerated RIH recurrent  Dr. Webb    Anesthesia Type:  General, ETT    Note:  Anesthesia Post Evaluation    Patient location during evaluation: PACU  Patient participation: Able to fully participate in evaluation  Level of consciousness: awake  Pain management: adequate  Airway patency: patent  Cardiovascular status: acceptable  Respiratory status: acceptable  Hydration status: acceptable  PONV: none             Last vitals:  Vitals:    12/07/18 0754 12/07/18 1105   BP: 134/83 143/82   Pulse: 65    Resp:  15   Temp: 98.1  F (36.7  C) 98.4  F (36.9  C)   SpO2: 95% 100%         Electronically Signed By: Deepak Benjamin MD  December 7, 2018  11:27 AM

## 2018-12-09 NOTE — OP NOTE
Procedure Date: 12/07/2018      POSTOPERATIVE DIAGNOSIS:  Recurrent right inguinal hernia with incarceration.      POSTOPERATIVE DIAGNOSIS:  Recurrent right inguinal hernia with incarceration.      PROCEDURE:  Robotic-assisted laparoscopic repair of recurrent right inguinal hernia with placement of mesh.      ANESTHESIA:  General plus local.      SURGEON:  Beni Webb MD      ASSISTANT:  Hiram Ceron PA-C,  the physician assistant was medical necessary for his skills in suturing, cutting of suture, exposure, traction, countertraction, docking of the robot and managing sutures and mesh throughout the operation.      SPECIMENS:  None submitted.      COMPLICATIONS:  None.      INDICATIONS:  Mr. Bro is a 78-year-old gentleman who I recently met in the hospital after he presented to the ER with an incarcerated recurrent right inguinal hernia involving bowel and manifesting as some small-bowel obstruction.  Thankfully, the bowel contents have been successfully reduced in the ER he was admitted for observation.  I met him in the morning after this reduction.  He was doing quite well at the time of discharge to home with followup in my office to discuss definitive repair of his hernia.  As he had a prior anterior approach to a right groin hernia repair in the past, I offered and recommended laparoscopy via robotics to reduce the chance of further recurrence of the hernia and to minimize damage to the cord and surrounding structures.  Risks of the operation, otherwise were discussed in detail, these include infection, bleeding, harm to adjacent structures, mesh infection, hernia recurrence.  The patient verbalized understanding of the above and consented to proceed.      FINDINGS:  The internal flange from his UltraPro hernia system was mildly curled around the edges, but there was no obvious indirect defect.  There was a strangulated fat contents in the direct space consistent with a recurrent direct hernia.  This  is partly reduced, otherwise the sac and fat contents were amputated and this site alone was repaired with a new piece of ProGrip mesh in an underlay fashion.      DESCRIPTION OF PROCEDURE:  With the patient under excellent general anesthesia in a supine position, Albarado catheter was placed to decompress the bladder, the abdomen subsequently clipped and prepped and draped in the usual sterile surgical fashion.  Timeout was then performed confirming the patient, procedure to be done as well as drug allergies and site markings.  He did receive a dose of Ancef for infection prophylaxis prior to making our incision.  We began by measuring the midline 20 cm superior to the pubis and made a 1.5 cm transverse incision here.  Electrocautery and blunt dissection was carried out down to the level of the midline epigastric fascia which was then grasped and elevated into view.  It was nicked sharply with a #15 blade.  The peritoneum was punctured bluntly with a Carmalt, we placed a figure-of-eight 0 Vicryl stitch around the site.  This was tagged and the camera port was placed through the defect.  Insufflation was applied.  The patient was placed 15 degrees head down.  Two further  8 mm ports were placed 10 cm lateral to midline under direct vision.  The da Verenice robot was docked without difficulty, Prograf was placed into the left operative arm and a monopolar scissors in the right.  We observed the right groin and noted the internal flange of the UltraPro hernia system emanating through the internal ring.  The edges of this were slightly curled, but there was no obvious recurrent indirect defect.  There was bulging, however, to the direct space.  We identified the ASIS by palpation and scored the peritoneum from lateral to medial to the medial umbilical ligament using monopolar scissors.  The preperitoneal space was then developed both medial and lateral to the mesh/internal flange portion of the UltraPro hernia system.   Lateral to the ring, the plane was readily dissectible and we were able to reflect the flange downwards, medial to this; however, it was quite adhesed to the epigastric vessels and surrounding tissue.  Medial even further, in the direct space, we noted fat emanating into a small direct defect which was indurated and incarcerated.  We were able to reduce a portion of this manually with the ProGrasp, but eventually had to amputate both the fat and the hernia sac.  We then developed the preperitoneal space inferior to the site of the direct space for several centimeters.  We measured the space medial to the internal ring as we created it, it was approximately 8 x 8 cm and we fashioned a ProGrip mesh 9 x 9 cm with a slight cutout in the inferolateral aspect of the mesh.  This was placed through our trocar and unfurled such that the mesh notch wrapped around the site of the internal ring.  We used a portion of our mesh remnant to reinforce the medial most aspect of our repair, creating an 8 x 6 cm rectangle and placing this such that it overlapped at about 2 cm with the medial part of our mesh.  We then closed the peritoneum with a running 3-0 V-Loc and closed the peritoneal rent at the site of the hernia with a second V-Loc suture.  Final survey of the low abdomen was made, the robotic instruments were removed and the robot was undocked.  The cannulas were removed without difficulty and a figure-of-eight stitch was tied down.  A 30 mL of 0.5% Marcaine distributed in all the incisions.  We closed the skin with 4-0 Vicryls in a deep interrupted fashion and applied skin glue.  The closed wounds.  The patient tolerated the procedure well and was extubated and brought to recovery in excellent condition after removal of his Albarado catheter.  All sharps and sponge counts were correct at the conclusion of the case.         FATUMA GIBSON MD             D: 12/07/2018   T: 12/09/2018   MT: FREDIS      Name:     GRABIEL CHAVARRIA   MRN:       [id number]        Account:        [id number]   :      1940           Procedure Date: 2018      Document: [id number]       cc: Michael Ni MD

## 2018-12-12 ENCOUNTER — TELEPHONE (OUTPATIENT)
Dept: SURGERY | Facility: CLINIC | Age: 78
End: 2018-12-12

## 2018-12-12 NOTE — TELEPHONE ENCOUNTER
Post Surgical Follow Up Call -     I left patient a VM message to check in with him after his recent surgery with .  Encouraged patient to call clinic if he has any questions or concerns.  Clinic phone # provided.

## 2018-12-21 ENCOUNTER — OFFICE VISIT (OUTPATIENT)
Dept: SURGERY | Facility: CLINIC | Age: 78
End: 2018-12-21
Payer: MEDICARE

## 2018-12-21 VITALS
WEIGHT: 195 LBS | DIASTOLIC BLOOD PRESSURE: 68 MMHG | OXYGEN SATURATION: 98 % | SYSTOLIC BLOOD PRESSURE: 118 MMHG | BODY MASS INDEX: 25.73 KG/M2 | HEART RATE: 77 BPM

## 2018-12-21 DIAGNOSIS — Z09 SURGICAL FOLLOWUP VISIT: Primary | ICD-10-CM

## 2018-12-21 PROCEDURE — 99024 POSTOP FOLLOW-UP VISIT: CPT | Performed by: SURGERY

## 2018-12-21 NOTE — LETTER
2018    RE: Jack Bro, : 1940      Dear Dr. Ni,     I had the pleasure of seeing Jack today in follow-up for his robotic assisted repair of a recurrent right inguinal hernia with mesh on 18. This was done in the context of a recent episode of hernia incarceration requiring reduction in the ED. The patient tolerated the procedure well and has no concerns today. We reviewed the operative findings today which showed a recurrent direct defect with incarcerated fat.      On exam, the patient's incisions are healing well without signs of infection. The right groin shows no clinical evidence of hernia. The firm ridge of fat below the inguinal canal is unchanged from before surgery; I suspect it is a lipoma.     Jack is doing very well postoperatively. We will be happy to see him in the future as needed. He was encouraged to call us with any questions or concerns.      Sincerely,            Beni Webb MD

## 2018-12-21 NOTE — PROGRESS NOTES
Re:  Jack Bro- 1940    Dear Dr. Ni,    I had the pleasure of seeing Jack today in follow-up for his robotic assisted repair of a recurrent right inguinal hernia with mesh on 12/7/18. This was done in the context of a recent episode of hernia incarceration requiring reduction in the ED. The patient tolerated the procedure well and has no concerns today. We reviewed the operative findings today which showed a recurrent direct defect with incarcerated fat.     On exam, the patient's incisions are healing well without signs of infection. The right groin shows no clinical evidence of hernia. The firm ridge of fat below the inguinal canal is unchanged from before surgery; I suspect it is a lipoma.    Jack is doing very well postoperatively. We will be happy to see him in the future as needed. He was encouraged to call us with any questions or concerns.      Sincerely,         Beni Webb MD      Please route or send letter to:  Primary Care Provider (PCP)

## 2019-01-04 ENCOUNTER — TELEPHONE (OUTPATIENT)
Dept: SURGERY | Facility: CLINIC | Age: 79
End: 2019-01-04

## 2019-01-04 ENCOUNTER — TELEPHONE (OUTPATIENT)
Dept: INTERNAL MEDICINE | Facility: CLINIC | Age: 79
End: 2019-01-04

## 2019-01-04 NOTE — TELEPHONE ENCOUNTER
Reason for Call:  Other     Detailed comments: Pt had hernia surgery 12/07 and since then has had a loss of appetite and is having a hard time sleeping at night.  Surgical consultants advised pt to call his primary.  Pt is currently in a TCU.    Phone Number Patient can be reached at: Home number on file [phone]    Best Time: any    Can we leave a detailed message on this number? No vmail    Call taken on 1/4/2019 at 2:21 PM by Nabila Pineda

## 2019-01-04 NOTE — TELEPHONE ENCOUNTER
"Name of caller: Patient    Reason for Call:  Loss of appetite    Surgeon:  Dr. Webb    Recent Surgery:  Yes.    If yes, when & what type:  12/7/18 [facility]      Best phone number to reach pt at is: [phone]  Ok to leave a message with medical info? Yes.    Pharmacy preferred (if calling for a refill): na    Addendum    Returned patients call.   S/P Robotic-assisted laparoscopic repair of recurrent right inguinal hernia with placement of mesh.   POSTOPERATIVE DIAGNOSIS:  Recurrent right inguinal hernia with incarceration.    POD# 28    Onset \"a couple of weeks ago\".  No nausea or vomiting.    Patient referred to PCP as this does not appear to be surgically related.  Advised on small portions frequently.  A favorite smell cooking can also increase appetite.  Indigo Rodrigez RN on 1/4/2019 at 12:26 PM              "

## 2019-01-04 NOTE — TELEPHONE ENCOUNTER
Contacted patient. He clarifies that he is not admitted to TCU, his wife is a patient there and he spends most of his day with her.     Loss of appetite and difficulty sleeping soon after hernia surgery. He is having some nausea and lower abdomen discomfort. No vomiting. He is not on any pain medication anymore. He has not been having regular bowel movements. He will wake up during the night and feel urge to have bowel movement at night, but then isn't able to have one. He did have a small soft bowel movement this morning, but it was much skinnier than normal. He is not drinking much water during the day, he admits to feeling thirsty and having dry mouth during the day and at night. He states that he feels like his stomach is waking him up at night to have a bowel movement.     Advised patient loss of appetite could be due to constipation. Recommended he increase water to 6-8 8oz glasses a day and try OTC Senakot-S, following directions on the label. Advised patient call back on Monday if there is no improvement in symptoms. Patient verbalizes understanding and agrees with plan.

## 2019-01-07 ENCOUNTER — TELEPHONE (OUTPATIENT)
Dept: INTERNAL MEDICINE | Facility: CLINIC | Age: 79
End: 2019-01-07

## 2019-01-07 NOTE — TELEPHONE ENCOUNTER
Reason for Call:  Other call back    Detailed comments: had hernia operation in December and now has lower intestinal problems which he would like to discuss with dr. cha     Phone Number Patient can be reached at:   Cell [phone]    Best Time: asap    Can we leave a detailed message on this number? YES    Call taken on 1/7/2019 at 12:05 PM by Dianne Neves

## 2019-01-08 NOTE — TELEPHONE ENCOUNTER
Contacted patient, this RN had spoke to him on Friday regarding symptoms. He did start Senakot-S as recommended by this RN, but still only passing small hard stools. Advised patient to start Miralax daily, could increase to BID if no results. Also suggested sitting in tub of warm water. Requested patient call back if no change in 3 days or he develops abdominal pain or vomiting.

## 2019-01-15 NOTE — TELEPHONE ENCOUNTER
Call back from patient. States yesterday he had diarrhea. Complains of abdominal pain that keeps him awake. Pain is a 5-6/10. Minimal nausea, no vomiting, no appetite. States he has lost weight. Patient did try Miralax once a day for a few days and then had diarrhea yesterday and the day before. Please advise if patient should see you or be referred else where.

## 2019-01-17 ENCOUNTER — HOSPITAL ENCOUNTER (OUTPATIENT)
Dept: CT IMAGING | Facility: CLINIC | Age: 79
Discharge: HOME | End: 2019-01-17
Attending: PHYSICIAN ASSISTANT | Admitting: PHYSICIAN ASSISTANT
Payer: MEDICARE

## 2019-01-17 ENCOUNTER — OFFICE VISIT (OUTPATIENT)
Dept: INTERNAL MEDICINE | Facility: CLINIC | Age: 79
End: 2019-01-17
Payer: MEDICARE

## 2019-01-17 VITALS
OXYGEN SATURATION: 99 % | WEIGHT: 175.3 LBS | TEMPERATURE: 98.4 F | DIASTOLIC BLOOD PRESSURE: 74 MMHG | SYSTOLIC BLOOD PRESSURE: 114 MMHG | HEART RATE: 84 BPM | BODY MASS INDEX: 23.13 KG/M2

## 2019-01-17 DIAGNOSIS — R63.4 WEIGHT LOSS: ICD-10-CM

## 2019-01-17 DIAGNOSIS — R10.31 ABDOMINAL PAIN, RIGHT LOWER QUADRANT: ICD-10-CM

## 2019-01-17 DIAGNOSIS — R10.31 ABDOMINAL PAIN, RIGHT LOWER QUADRANT: Primary | ICD-10-CM

## 2019-01-17 DIAGNOSIS — R19.7 DIARRHEA, UNSPECIFIED TYPE: ICD-10-CM

## 2019-01-17 LAB
ALBUMIN SERPL-MCNC: 4.2 G/DL (ref 3.4–5)
ALP SERPL-CCNC: 48 U/L (ref 40–150)
ALT SERPL W P-5'-P-CCNC: 31 U/L (ref 0–70)
ANION GAP SERPL CALCULATED.3IONS-SCNC: 9 MMOL/L (ref 3–14)
AST SERPL W P-5'-P-CCNC: 21 U/L (ref 0–45)
BASOPHILS # BLD AUTO: 0 10E9/L (ref 0–0.2)
BASOPHILS NFR BLD AUTO: 0.4 %
BILIRUB SERPL-MCNC: 0.5 MG/DL (ref 0.2–1.3)
BUN SERPL-MCNC: 14 MG/DL (ref 7–30)
CALCIUM SERPL-MCNC: 9.2 MG/DL (ref 8.5–10.1)
CHLORIDE SERPL-SCNC: 103 MMOL/L (ref 94–109)
CO2 SERPL-SCNC: 26 MMOL/L (ref 20–32)
CREAT SERPL-MCNC: 0.88 MG/DL (ref 0.66–1.25)
DIFFERENTIAL METHOD BLD: NORMAL
EOSINOPHIL # BLD AUTO: 0.1 10E9/L (ref 0–0.7)
EOSINOPHIL NFR BLD AUTO: 1.1 %
ERYTHROCYTE [DISTWIDTH] IN BLOOD BY AUTOMATED COUNT: 13.3 % (ref 10–15)
GFR SERPL CREATININE-BSD FRML MDRD: 82 ML/MIN/{1.73_M2}
GLUCOSE SERPL-MCNC: 121 MG/DL (ref 70–99)
HCT VFR BLD AUTO: 40.7 % (ref 40–53)
HGB BLD-MCNC: 13.4 G/DL (ref 13.3–17.7)
LYMPHOCYTES # BLD AUTO: 1.4 10E9/L (ref 0.8–5.3)
LYMPHOCYTES NFR BLD AUTO: 24.5 %
MCH RBC QN AUTO: 28.5 PG (ref 26.5–33)
MCHC RBC AUTO-ENTMCNC: 32.9 G/DL (ref 31.5–36.5)
MCV RBC AUTO: 87 FL (ref 78–100)
MONOCYTES # BLD AUTO: 0.5 10E9/L (ref 0–1.3)
MONOCYTES NFR BLD AUTO: 9.6 %
NEUTROPHILS # BLD AUTO: 3.6 10E9/L (ref 1.6–8.3)
NEUTROPHILS NFR BLD AUTO: 64.4 %
PLATELET # BLD AUTO: 172 10E9/L (ref 150–450)
POTASSIUM SERPL-SCNC: 3.9 MMOL/L (ref 3.4–5.3)
PROT SERPL-MCNC: 6.9 G/DL (ref 6.8–8.8)
RBC # BLD AUTO: 4.7 10E12/L (ref 4.4–5.9)
SODIUM SERPL-SCNC: 138 MMOL/L (ref 133–144)
WBC # BLD AUTO: 5.6 10E9/L (ref 4–11)

## 2019-01-17 PROCEDURE — 74177 CT ABD & PELVIS W/CONTRAST: CPT

## 2019-01-17 PROCEDURE — 99214 OFFICE O/P EST MOD 30 MIN: CPT | Performed by: PHYSICIAN ASSISTANT

## 2019-01-17 PROCEDURE — 25000128 H RX IP 250 OP 636: Performed by: RADIOLOGY

## 2019-01-17 PROCEDURE — 85025 COMPLETE CBC W/AUTO DIFF WBC: CPT | Performed by: PHYSICIAN ASSISTANT

## 2019-01-17 PROCEDURE — 80053 COMPREHEN METABOLIC PANEL: CPT | Performed by: PHYSICIAN ASSISTANT

## 2019-01-17 PROCEDURE — 36415 COLL VENOUS BLD VENIPUNCTURE: CPT | Performed by: PHYSICIAN ASSISTANT

## 2019-01-17 RX ORDER — IOPAMIDOL 755 MG/ML
500 INJECTION, SOLUTION INTRAVASCULAR ONCE
Status: COMPLETED | OUTPATIENT
Start: 2019-01-17 | End: 2019-01-17

## 2019-01-17 RX ADMIN — IOPAMIDOL 88 ML: 755 INJECTION, SOLUTION INTRAVENOUS at 17:05

## 2019-01-17 NOTE — PROGRESS NOTES
SUBJECTIVE:   Jack Bro is a 78 year old male who presents to clinic today for the following health issues:  Diarrhea      Duration: 3-4 days    Description:       Consistency of stool: watery       Blood in stool: no        Number of loose stools past 24 hours: 3 or 4    Intensity:  mild    Accompanying signs and symptoms:       Fever: no        Nausea/vomitting: no        Abdominal pain: YES       Weight loss: YES- Has lost about 20# in the last month    History (recent antibiotics or travel/ill contacts/med changes/testing done): this all started after a surgical procedure for internal hernia    Precipitating or alleviating factors: None    Therapies tried and outcome: Mirilax and Senkot because he was having constipation issues before that    - After surgery on the 12/07/18 pt experienced loss of appetite   - pt had an inguinal hernia that was incarcerated with SBO  - pt had ENRIQUETA and constipation, then tried Senakot, then miralax   - diarrhea has been off and on since last weekend   - some abdominal pain, located in the lower abdomen   - unsure if pain correlated with BM   - no nausea, vomiting, fever, or body aches   - pt ate last night, but then has loose stool following this   - pt notes excessively thirsty with increased urination  - pt denies dysuria       Problem list and histories reviewed & adjusted, as indicated.    Reviewed and updated as needed this visit by clinical staff  Tobacco  Allergies  Meds  Problems       Reviewed and updated as needed this visit by Provider  Meds  Problems         OBJECTIVE:     /74   Pulse 84   Temp 98.4  F (36.9  C) (Oral)   Wt 79.5 kg (175 lb 4.8 oz)   SpO2 99%   BMI 23.13 kg/m    Body mass index is 23.13 kg/m .  GENERAL: healthy, alert and no distress  HENT: normal cephalic/atraumatic, oropharynx clear and oral mucous membranes moist  RESP: lungs clear to auscultation - no rales, rhonchi or wheezes  CV: regular rates and rhythm, normal S1 S2, no  S3 or S4 and no murmur, click or rub  ABDOMEN: soft,  without hepatosplenomegaly or masses, tenderness RLQ and bowel sounds normal    Diagnostic Test Results:  none     ASSESSMENT/PLAN:     1. Abdominal pain, right lower quadrant  2. Diarrhea, unspecified type  3. Weight loss  - work up as below, reviewed signs and symptoms that should prompt seeking emergency care   - will need further work up if work up is normal  - as far as weight loss, pt's wife is in rehab facility and pt has had significant changes to his diet due to this   - CT Abdomen Pelvis w Contrast; Future  - CBC with platelets and differential  - Comprehensive metabolic panel      Aylin Martinez PA-C  [facility]

## 2019-01-25 ENCOUNTER — OFFICE VISIT (OUTPATIENT)
Dept: INTERNAL MEDICINE | Facility: CLINIC | Age: 79
End: 2019-01-25
Payer: MEDICARE

## 2019-01-25 VITALS
BODY MASS INDEX: 23.57 KG/M2 | SYSTOLIC BLOOD PRESSURE: 114 MMHG | TEMPERATURE: 98.2 F | OXYGEN SATURATION: 100 % | HEART RATE: 87 BPM | WEIGHT: 174 LBS | DIASTOLIC BLOOD PRESSURE: 68 MMHG | RESPIRATION RATE: 16 BRPM | HEIGHT: 72 IN

## 2019-01-25 DIAGNOSIS — R63.4 LOSS OF WEIGHT: ICD-10-CM

## 2019-01-25 DIAGNOSIS — R10.30 LOWER ABDOMINAL PAIN: Primary | ICD-10-CM

## 2019-01-25 DIAGNOSIS — G47.00 INSOMNIA, UNSPECIFIED TYPE: ICD-10-CM

## 2019-01-25 DIAGNOSIS — K21.9 GASTROESOPHAGEAL REFLUX DISEASE WITHOUT ESOPHAGITIS: ICD-10-CM

## 2019-01-25 PROCEDURE — 99214 OFFICE O/P EST MOD 30 MIN: CPT | Performed by: INTERNAL MEDICINE

## 2019-01-25 RX ORDER — TRAZODONE HYDROCHLORIDE 50 MG/1
25-100 TABLET, FILM COATED ORAL
Qty: 30 TABLET | Refills: 1 | Status: SHIPPED | OUTPATIENT
Start: 2019-01-25 | End: 2019-02-22

## 2019-01-25 RX ORDER — OMEPRAZOLE 20 MG/1
20 TABLET, DELAYED RELEASE ORAL DAILY
Qty: 90 TABLET | Refills: 3 | Status: SHIPPED | OUTPATIENT
Start: 2019-01-25

## 2019-01-25 ASSESSMENT — MIFFLIN-ST. JEOR: SCORE: 1546.77

## 2019-01-25 NOTE — PROGRESS NOTES
SUBJECTIVE:   Jack Bro is a 78 year old male who presents to clinic today for the following health issues:    Abdominal pain  Patient was seen by Aylin Martinez PA-C on 1/17/2019 for evaluation of abdominal pain, diarrhea, and unintended weight loss. Recalled that patient was seen at the ED on 11/18/2018 for evaluation of a bowel obstruction. He had an incarcerated right inguinal hernia, which was reduced by general surgery manually. He was started on a laxative regime for constipation. He then had the hernia repaired with mesh on 12/7/2018. Patient followed up with general surgery on 12/21/2018, he was doing well and healing well post op.     He notes ongoing diarrhea. Stools are soft and semi-formed. Appetite remains poor and he continues to have abdominal cramps and abdominal pain, localized to the umbilicus region. Notes that it feels as if he constantly needs to have a BM. He is no longer taking any laxatives. Pains wake him up at night. Symptoms do not improve after a BM, urination, or eating. He notes frequent urination. He does have a hx of prostate surgery and abdominal CT scan on 1/17/2019 revealed a massively enlarged prostate that is stable, a small herniated fat at right inguinal canal that is table, a stable AAA, and diverticulosis of colon. Patient reports slowing of urine stream. Denies hematuria or dysuria. He is currently on finasteride 5 mg daily.     Noted that Aylin's noted indicated that she wondered if symptoms were related to situational stress. His wife's health is deteriorating after her stroke and she was moved to [facility]. She will need long term care. Notes that he has not been sleeping the past 1-2 weeks. He is able to fall sleep but then wakes up and cannot fall asleep again.     GERD  Patient states that since he was switched to capsule form of omeprazole he has had an increase of gas.     Rash  He notes a diffuse rash on his back. First noted it about a month ago. It  "is itchy now but was not when it first occurred.     Problem list and histories reviewed & adjusted, as indicated.  Additional history: as documented    Labs reviewed in EPIC    Reviewed and updated as needed this visit by clinical staff  Tobacco  Allergies  Meds  Med Hx  Surg Hx  Fam Hx  Soc Hx      Reviewed and updated as needed this visit by Provider         ROS:  ROS: as above or negative for Constitutional, HEENT, Respiratory, CV, GI, , musculoskeletal, derm systems.    This document serves as a record of the services and decisions personally performed and made by Michael Ni MD. It was created on his behalf by Nilsa Ricardo, a trained medical scribe. The creation of this document is based on the provider's statements to the medical scribe.  Nilsa Ricardo January 25, 2019 2:06 PM     OBJECTIVE:     /68 (BP Location: Right arm, Patient Position: Sitting, Cuff Size: Adult Large)   Pulse 87   Temp 98.2  F (36.8  C) (Oral)   Resp 16   Ht 1.82 m (5' 11.65\")   Wt 78.9 kg (174 lb)   SpO2 100%   BMI 23.83 kg/m    Body mass index is 23.83 kg/m .     GENERAL: healthy, alert and no distress  NECK: no adenopathy, no asymmetry, masses, or scars and thyroid normal to palpation  RESP: lungs clear to auscultation - no rales, rhonchi or wheezes  CV: regular rate and rhythm, normal S1 S2, no S3 or S4, no murmur, click or rub, no peripheral edema and peripheral pulses strong  ABDOMEN: soft, tenderness over the lower abdominal region, no hepatosplenomegaly, no masses and bowel sounds normal  Rectal: markedly enlarged prostate, otherwise normal.   MS: no gross musculoskeletal defects noted, no edema  SKIN: no suspicious lesions or rashes  NEURO: Normal strength and tone, mentation intact and speech normal  PSYCH: mentation appears normal, affect normal/bright    Diagnostic Test Results:  none     ASSESSMENT/PLAN:   (R10.30) Lower abdominal pain  (primary encounter diagnosis)  Comment: Unclear if related to " situational stress. Will continue to monitor if further work up is needed.     (R63.4) Loss of weight  Comment: Unclear if related to situational stress. Discussed considering adding on Namenda for both weight management and mood if weight loss persists.     (G47.00) Insomnia, unspecified type  Comment: Starting patient on trazodone.   Plan: traZODone (DESYREL) 50 MG tablet          (K21.9) Gastroesophageal reflux disease without esophagitis  Comment: Will try to switch back to capsule form if covered by insurance as patient experienced an increase in gas with tablet form  Plan: omeprazole (PRILOSEC OTC) 20 MG EC tablet          FUTURE APPOINTMENTS:       - Follow-up visit in 4 weeks    Patient Instructions   I'm not sure as to the cause of the lower abdominal pain.   It is tempting to blame the weight and appetite loss on both your recent surgery and your family stress.   However, we need to keep in mind the possibility of needing to do further investigating.   We have pursued a surgery consult and an abdominal CT scan already, which would be the most fruitful things to pursue at first.     The information in this document, created by the medical scribe for me, accurately reflects the services I personally performed and the decisions made by me. I have reviewed and approved this document for accuracy prior to leaving the patient care area.  January 25, 2019 3:00 PM    Michael Ni MD,   [facility]

## 2019-01-25 NOTE — PATIENT INSTRUCTIONS
"I'm not sure as to the cause of the lower abdominal pain.   It is tempting to blame the weight and appetite loss on both your recent surgery and your family stress.   However, we need to keep in mind the possibility of needing to do further investigating.   We have pursued a surgery consult and an abdominal CT scan already, which would be the most fruitful things to pursue at first.     Let's try switching back to the Prilosec OTC, to see if this has any bearing on your stomach symptoms.     We need to get you sleeping at night. I've sent a new prescription for Trazodone 50 mg tabs. You may take as little as a half-tablet, up to two tablets at bedtime. (Would start at one tablet).     Let's set up an appointment for a month from now. Call sooner if doing poorly. We might consider a medication called mirtazepine (\"Remeron\"), which is for depression but helps a lot with appetite, weight gain, and sleep.     See me in four weeks.   "

## 2019-02-22 ENCOUNTER — OFFICE VISIT (OUTPATIENT)
Dept: INTERNAL MEDICINE | Facility: CLINIC | Age: 79
End: 2019-02-22
Payer: MEDICARE

## 2019-02-22 VITALS
HEART RATE: 95 BPM | OXYGEN SATURATION: 100 % | BODY MASS INDEX: 23.16 KG/M2 | WEIGHT: 171 LBS | DIASTOLIC BLOOD PRESSURE: 62 MMHG | HEIGHT: 72 IN | TEMPERATURE: 97.7 F | RESPIRATION RATE: 16 BRPM | SYSTOLIC BLOOD PRESSURE: 112 MMHG

## 2019-02-22 DIAGNOSIS — R63.4 LOSS OF WEIGHT: ICD-10-CM

## 2019-02-22 DIAGNOSIS — G47.00 INSOMNIA, UNSPECIFIED TYPE: ICD-10-CM

## 2019-02-22 DIAGNOSIS — R10.31 BILATERAL LOWER ABDOMINAL DISCOMFORT: ICD-10-CM

## 2019-02-22 DIAGNOSIS — R10.32 BILATERAL LOWER ABDOMINAL DISCOMFORT: ICD-10-CM

## 2019-02-22 DIAGNOSIS — R39.198 SLOWING OF URINARY STREAM: ICD-10-CM

## 2019-02-22 DIAGNOSIS — F43.21 GRIEF REACTION: Primary | ICD-10-CM

## 2019-02-22 PROCEDURE — 99214 OFFICE O/P EST MOD 30 MIN: CPT | Performed by: INTERNAL MEDICINE

## 2019-02-22 RX ORDER — MIRTAZAPINE 15 MG/1
TABLET, FILM COATED ORAL
Qty: 30 TABLET | Refills: 1 | Status: SHIPPED | OUTPATIENT
Start: 2019-02-22 | End: 2019-04-05

## 2019-02-22 RX ORDER — TRAZODONE HYDROCHLORIDE 50 MG/1
100-150 TABLET, FILM COATED ORAL
Qty: 60 TABLET | Refills: 1 | Status: SHIPPED | OUTPATIENT
Start: 2019-02-22 | End: 2019-04-05

## 2019-02-22 RX ORDER — FINASTERIDE 5 MG/1
1 TABLET, FILM COATED ORAL DAILY
Qty: 90 TABLET | Refills: 3 | Status: SHIPPED | OUTPATIENT
Start: 2019-02-22

## 2019-02-22 ASSESSMENT — MIFFLIN-ST. JEOR: SCORE: 1533.16

## 2019-02-22 ASSESSMENT — PATIENT HEALTH QUESTIONNAIRE - PHQ9: SUM OF ALL RESPONSES TO PHQ QUESTIONS 1-9: 6

## 2019-02-22 NOTE — PROGRESS NOTES
SUBJECTIVE:   Jack Bro is a 78 year old male who presents to clinic today for the following health issues: sleep concerns, congestion and lack of appetite.    Abdominal pain  Recalled that at LOV 1/25/2019 we discussed abdominal pains, diarrhea, and unintended weight loss. Recalled that patient had a hernia repair with mesh on 12/7/2018 and did well post op. Also recalled that abdominal CT scan on 1/1/2019 revealed a massively enlarged prostate that is stable, a small herniated fat at right inguinal canal that is stable, a stable AAA, and diverticulosis of colon.     Today patient reports that diarrhea has improved, stools are now small and narrow (colonoscopy on 8/5/2014 was normal). He reports ongoing bilateral lower abdominal discomfort and gas.     Loss of weight   Despite improvement of previous diarrhea, he has not gained weight.     Insomnia  Trazodone added at LOV 1/25/2019. He is still not sleeping well and continues to wake up often. He tried taking 2 tabs of trazodone which did not help. He also reports nasal congestion which does contribute to him waking at night and can cause SOB at times.     Anxiety  Patient's wife had a stroke 11/2018 which affected her right arm and right leg function. Her speech was also affected. There has been no improvement in her condition.      Enlarged prostate  Proscar has not helped improve urine flow.       Problem list and histories reviewed & adjusted, as indicated.  Additional history: as documented    Labs reviewed in EPIC    Reviewed and updated as needed this visit by clinical staff  Tobacco  Allergies  Meds  Med Hx  Surg Hx  Fam Hx  Soc Hx      Reviewed and updated as needed this visit by Provider         ROS:  No dyspnea or cough. No chest discomfort, dizziness or palpitations. No diarrhea, localized abdominal pain or rectal bleeding.   No acute problems with vision or speech, lateralizing weakness or paresthesias.    ROS: as above or negative for  "Respiratory, CV, GI, endocrine, neuro systems.    This document serves as a record of the services and decisions personally performed and made by Michael Ni MD. It was created on his behalf by Nilsa Ricardo, a trained medical scribe. The creation of this document is based on the provider's statements to the medical scribe.  Nilsa Ricardo February 22, 2019 2:08 PM     OBJECTIVE:     /62 (BP Location: Left arm, Patient Position: Sitting, Cuff Size: Adult Large)   Pulse 95   Temp 97.7  F (36.5  C) (Oral)   Resp 16   Ht 1.82 m (5' 11.65\")   Wt 77.6 kg (171 lb)   SpO2 100%   BMI 23.42 kg/m    Body mass index is 23.42 kg/m .     GENERAL: healthy, alert and no distress  RESP: lungs clear to auscultation - no rales, rhonchi or wheezes  CV: regular rate and rhythm, normal S1 S2, no S3 or S4, no murmur, click or rub, no peripheral edema and peripheral pulses strong  ABDOMEN: soft, nontender, no hepatosplenomegaly, no masses   MS: no gross musculoskeletal defects noted, no edema  SKIN: no suspicious lesions or rashes  NEURO: Normal strength and tone, mentation intact and speech normal  PSYCH: mentation appears normal, affect normal/bright    Diagnostic Test Results:  none     ASSESSMENT/PLAN:   (F43.21) Grief reaction  (primary encounter diagnosis)  Comment: Recommended trialing Remeron and monitoring if appetite, sleep, and mood improves.  Plan: mirtazapine (REMERON) 15 MG tablet          (R39.198) Slowing of urinary stream  Comment: Stable. Continue current meds.   Plan: finasteride (PROSCAR) 5 MG tablet          (G47.00) Insomnia, unspecified type  Comment: Recommended trailing Remeron and monitoring if appetite, sleep, and mood improves.   Plan: mirtazapine (REMERON) 15 MG tablet, traZODone         (DESYREL) 50 MG tablet          (R63.4) Loss of weight  (R10.31,  R10.32) Bilateral lower abdominal discomfort  Comment: See above. If still noticing no improvement, patient will follow up with GI.   Plan: " mirtazapine (REMERON) 15 MG tablet,         GASTROENTEROLOGY ADULT REF CONSULT ONLY                FUTURE APPOINTMENTS:       - Follow-up visit in 6 weeks    Patient Instructions   Let's try starting you on mirtazepine (Remeron). This is taken at bedtime, usually used for depression. Side effects would in your case be beneficial--they include improved appetite/weight, and improved sleep quality.     If you continue to be losing weight, having unimproved lower abdominal discomfort, etc, we should consider having you see a GI specialist--contact information provided.     See me back in about six weeks.       The information in this document, created by the medical scribe for me, accurately reflects the services I personally performed and the decisions made by me. I have reviewed and approved this document for accuracy prior to leaving the patient care area.  February 22, 2019 2:32 PM    Michael Ni MD,    [facility]

## 2019-02-22 NOTE — PATIENT INSTRUCTIONS
Let's try starting you on mirtazepine (Remeron). This is taken at bedtime, usually used for depression. Side effects would in your case be beneficial--they include improved appetite/weight, and improved sleep quality.     If you continue to be losing weight, having unimproved lower abdominal discomfort, etc, we should consider having you see a GI specialist--contact information provided.     See me back in about six weeks.

## 2019-04-05 ENCOUNTER — OFFICE VISIT (OUTPATIENT)
Dept: INTERNAL MEDICINE | Facility: CLINIC | Age: 79
End: 2019-04-05
Payer: MEDICARE

## 2019-04-05 VITALS
DIASTOLIC BLOOD PRESSURE: 64 MMHG | WEIGHT: 172 LBS | HEART RATE: 89 BPM | RESPIRATION RATE: 16 BRPM | BODY MASS INDEX: 23.3 KG/M2 | HEIGHT: 72 IN | TEMPERATURE: 97.6 F | SYSTOLIC BLOOD PRESSURE: 112 MMHG | OXYGEN SATURATION: 98 %

## 2019-04-05 DIAGNOSIS — F43.21 GRIEF REACTION: ICD-10-CM

## 2019-04-05 DIAGNOSIS — G47.00 INSOMNIA, UNSPECIFIED TYPE: Primary | ICD-10-CM

## 2019-04-05 DIAGNOSIS — J31.0 CHRONIC RHINITIS: ICD-10-CM

## 2019-04-05 DIAGNOSIS — R63.4 LOSS OF WEIGHT: ICD-10-CM

## 2019-04-05 DIAGNOSIS — R10.30 LOWER ABDOMINAL PAIN: ICD-10-CM

## 2019-04-05 DIAGNOSIS — K59.00 CONSTIPATION, UNSPECIFIED CONSTIPATION TYPE: ICD-10-CM

## 2019-04-05 PROCEDURE — 99214 OFFICE O/P EST MOD 30 MIN: CPT | Performed by: INTERNAL MEDICINE

## 2019-04-05 RX ORDER — MIRTAZAPINE 15 MG/1
30 TABLET, FILM COATED ORAL AT BEDTIME
Qty: 60 TABLET | Refills: 1 | Status: SHIPPED | OUTPATIENT
Start: 2019-04-05 | End: 2019-05-24

## 2019-04-05 RX ORDER — IPRATROPIUM BROMIDE 42 UG/1
2 SPRAY, METERED NASAL 2 TIMES DAILY PRN
Qty: 15 ML | Refills: 1 | Status: SHIPPED | OUTPATIENT
Start: 2019-04-05

## 2019-04-05 RX ORDER — TRAZODONE HYDROCHLORIDE 50 MG/1
100-150 TABLET, FILM COATED ORAL
Qty: 90 TABLET | Refills: 1 | Status: SHIPPED | OUTPATIENT
Start: 2019-04-05 | End: 2019-05-24

## 2019-04-05 ASSESSMENT — MIFFLIN-ST. JEOR: SCORE: 1532.63

## 2019-04-05 NOTE — PROGRESS NOTES
SUBJECTIVE:   Jack Bro is a 79 year old male who presents to clinic today for the following health issues:    Insomnia  Recalled that Remeron was started at LOV 2/22/2019. He is taking 15 mg nightly. His sleep and energy levels have not improved with Remeron. He is still taking trazodone 100 mg nightly but just took 150 mg yesterday night. He continues to have difficulty falling back asleep in the middle of the night. Often times he wakes up due to nasal congestion which has been ongoing for some time now. Denies any nasal drainage.     Reviewed that common side effects of trazodone and remeron include dizziness, nausea, and dry mouth. Patient endorses nausea and dry mouth but no dizziness.     Loss of appetite    Wt Readings from Last 3 Encounters:   04/05/19 78 kg (172 lb)   02/22/19 77.6 kg (171 lb)   01/25/19 78.9 kg (174 lb)     Noted that patient gained 1 lb since LOV. He still has a reduced appetite but states that he is trying to eat because he knows he has to. He clarifies that he does not necessarily have no appetite, but rather he fills up quickly when eating.     Abdominal pains  The lower abdominal pains persist.  This has been discussed at office visits 1/25 and 2/22. Abdominal pains have not worsened and have not improved. They occur daily and do keep him up at night. Notes that he has loose stools on Monday but has not had a BM since. He has been mostly constipated. He has never been seen by GI.     Problem list and histories reviewed & adjusted, as indicated.  Additional history: as documented    Labs reviewed in EPIC    Reviewed and updated as needed this visit by clinical staff  Tobacco  Allergies  Meds  Med Hx  Surg Hx  Fam Hx  Soc Hx      Reviewed and updated as needed this visit by Provider         ROS:  No dyspnea or cough. No chest discomfort, dizziness or palpitations. No diarrhea, abdominal pain or rectal bleeding.   No acute problems with vision or speech, lateralizing  "weakness or paresthesias.    ROS: as above or negative for HEENT, Respiratory, CV, GI, psychiatric, neuro systems.    This document serves as a record of the services and decisions personally performed and made by Michael Ni MD. It was created on his behalf by Nilsa Ricardo, a trained medical scribe. The creation of this document is based on the provider's statements to the medical scribe.  Nilsa Ricardo April 5, 2019 3:50 PM     OBJECTIVE:     /64 (BP Location: Right arm, Patient Position: Sitting, Cuff Size: Adult Large)   Pulse 89   Temp 97.6  F (36.4  C) (Oral)   Resp 16   Ht 1.82 m (5' 11.65\")   Wt 78 kg (172 lb)   SpO2 98%   BMI 23.56 kg/m    Body mass index is 23.56 kg/m .     GENERAL: healthy, alert and no distress  MS: no gross musculoskeletal defects noted, no edema  SKIN: no suspicious lesions or rashes  NEURO: Normal strength and tone, mentation intact and speech normal  PSYCH: mentation appears normal, affect normal/bright    Diagnostic Test Results:  none     ASSESSMENT/PLAN:   (G47.00) Insomnia, unspecified type  (primary encounter diagnosis)  Comment: Recommended increasing remeron dosage to 30 mg in an attempt to improve weight, appetite, and sleep. May also increase trazodone dosage to 150 mg nightly if desired.  Reviewed and discussed potential side effects of remeron and trazodone at increased doses. Will let me know if experiencing any.  Plan: mirtazapine (REMERON) 15 MG tablet, traZODone         (DESYREL) 50 MG tablet          (F43.21) Grief reaction  Comment: Increasing remeron dosage to 30 mg daily  Plan: mirtazapine (REMERON) 15 MG tablet          (R63.4) Loss of weight  Comment: Ongoing loss of appetite. Increasing remeron dosage to 30 mg. Referred patient to GI  Plan: mirtazapine (REMERON) 15 MG tablet,         GASTROENTEROLOGY ADULT REF CONSULT ONLY          (R10.30) Lower abdominal pain  Comment: Ongoing lower quadrant abdominal pains. Recommend patient follow up with " GI.   Plan: GASTROENTEROLOGY ADULT REF CONSULT ONLY          (K59.00) Constipation, unspecified constipation type  Comment: Ongoing constipation.    (J31.0) Chronic rhinitis  Comment: Suggested using Flonase daily or Atrovent nasal inhaler prn.   Plan: ipratropium (ATROVENT) 0.06 % nasal spray          FUTURE APPOINTMENTS:       - Follow-up visit in 6-8 weeks    Patient Instructions   Let's try raising the mirtazepine (hoping for help with sleep, appetite, weight) from 15 mg to 30 mg at bedtime.     You may also try taking three of the Trazodone 50 mg tablets at each bedtime (for sleep).     Flonase nose inhaler might help for the nasal congestion--two puffs each nostril once daily. This used be used every day.   Another inhaler to consider using as needed would be ipratropium nasal inhaler twice a day as needed. This may be used here and there.     Let's also get help from the Gastroenterology specialist--give them a call for an appointment.     See me back in about 6-8 weeks.     The information in this document, created by the medical scribe for me, accurately reflects the services I personally performed and the decisions made by me. I have reviewed and approved this document for accuracy prior to leaving the patient care area.  April 5, 2019 4:08 PM    Michael Ni MD,   [facility]

## 2019-04-05 NOTE — PATIENT INSTRUCTIONS
Let's try raising the mirtazepine (hoping for help with sleep, appetite, weight) from 15 mg to 30 mg at bedtime.     You may also try taking three of the Trazodone 50 mg tablets at each bedtime (for sleep).     Flonase nose inhaler might help for the nasal congestion--two puffs each nostril once daily. This used be used every day.   Another inhaler to consider using as needed would be ipratropium nasal inhaler twice a day as needed. This may be used here and there.     Let's also get help from the Gastroenterology specialist--give them a call for an appointment.     See me back in about 6-8 weeks.

## 2019-04-11 ENCOUNTER — TRANSFERRED RECORDS (OUTPATIENT)
Dept: HEALTH INFORMATION MANAGEMENT | Facility: CLINIC | Age: 79
End: 2019-04-11

## 2019-04-15 ENCOUNTER — OFFICE VISIT (OUTPATIENT)
Dept: INTERNAL MEDICINE | Facility: CLINIC | Age: 79
End: 2019-04-15
Payer: MEDICARE

## 2019-04-15 ENCOUNTER — NURSE TRIAGE (OUTPATIENT)
Dept: INTERNAL MEDICINE | Facility: CLINIC | Age: 79
End: 2019-04-15

## 2019-04-15 ENCOUNTER — TELEPHONE (OUTPATIENT)
Dept: INTERNAL MEDICINE | Facility: CLINIC | Age: 79
End: 2019-04-15

## 2019-04-15 VITALS
WEIGHT: 170 LBS | TEMPERATURE: 97.6 F | SYSTOLIC BLOOD PRESSURE: 110 MMHG | BODY MASS INDEX: 23.8 KG/M2 | HEART RATE: 91 BPM | DIASTOLIC BLOOD PRESSURE: 62 MMHG | HEIGHT: 71 IN | RESPIRATION RATE: 16 BRPM | OXYGEN SATURATION: 99 %

## 2019-04-15 DIAGNOSIS — H11.32 SUBCONJUNCTIVAL HEMORRHAGE OF LEFT EYE: Primary | ICD-10-CM

## 2019-04-15 PROCEDURE — 99213 OFFICE O/P EST LOW 20 MIN: CPT | Performed by: NURSE PRACTITIONER

## 2019-04-15 ASSESSMENT — MIFFLIN-ST. JEOR: SCORE: 1513.24

## 2019-04-15 NOTE — TELEPHONE ENCOUNTER
Reason for Call:  Other call back    Detailed comments: pt calling concern about eye. Dr Ni gave pt a rx for a spray for pts eye. Pt woke up with a sore eye and some blood. Not the first time using the spray. Please call pt back and advise.    Phone Number Patient can be reached at: Cell number on file:    Telephone Information:   Mobile [phone]       Best Time: anytime    Can we leave a detailed message on this number? YES    Call taken on 4/15/2019 at 8:46 AM by Tessa Barker

## 2019-04-15 NOTE — PROGRESS NOTES
SUBJECTIVE:   Jack Bro is a 79 year old male who presents to clinic today for the following   health issues:        Hemorrhage of eye      Duration: noted this AM    Description (location/character/radiation): L eye, blood red of whites of eye, no vision loss or change, no pain    Intensity:  mild    Accompanying signs and symptoms: none    History (similar episodes/previous evaluation): no injury, fall, heavy sneezing, coughing, vomiting.  Has been straining due to constipation    Precipitating or alleviating factors: takes daily ASA    Therapies tried and outcome: None           Additional history: as documented    Reviewed  and updated as needed this visit by clinical staff  Tobacco  Allergies  Meds  Med Hx  Surg Hx  Fam Hx  Soc Hx        Reviewed and updated as needed this visit by Provider         Patient Active Problem List   Diagnosis     Esophageal reflux     CARDIOVASCULAR SCREENING; LDL GOAL LESS THAN 160     Advance Care Planning     Pain in joint, pelvic region and thigh     Slowing of urinary stream     Hernia, inguinal     Past Surgical History:   Procedure Laterality Date     C NONSPECIFIC PROCEDURE      Removal of lipomas     C NONSPECIFIC PROCEDURE      Prostate biopsies on two occasions     COLONOSCOPY  8/5/2014    Diverticuli, o/w normal. Procedure: COLONOSCOPY;  Surgeon: Enmanuel Carroll MD;  Location:  GI     DAVINCI HERNIORRHAPHY INGUINAL Right 12/7/2018    Procedure: Robotic assisted laparoscopic repair of recurrent right inguinal hernia with mesh;  Surgeon: Beni Galicia MD;  Location:  OR     EYE SURGERY      cataracts     HC COLONOSCOPY THRU STOMA, DIAGNOSTIC  4/5/2004    Diverticuli, o/w normal     HC REMOVE TONSILS/ADENOIDS,<11 Y/O  1946    T & A <13y.o.     HC REPAIR SLIDING INGUINAL HERNIA  1989    lt     HERNIORRHAPHY INGUINAL Right 11/10/2016    Procedure: HERNIORRHAPHY INGUINAL;  Surgeon: Julio Lund MD;  Location:  OR     SURGICAL HISTORY OF -    "    lt shoulder Rotator cuff repair       Social History     Tobacco Use     Smoking status: Former Smoker     Packs/day: 0.50     Years: 20.00     Pack years: 10.00     Types: Cigarettes     Last attempt to quit: 1984     Years since quittin.5     Smokeless tobacco: Never Used   Substance Use Topics     Alcohol use: Yes     Alcohol/week: 0.0 oz     Comment: About two drinks weekly     Family History   Problem Relation Age of Onset     C.A.D. Mother          age 97. Had MI at age 77     Coronary Artery Disease Mother      Eye Disorder Father         macular degeneration     Cerebrovascular Disease Father          age 93. Had some \"minor strokes\"     Cerebrovascular Disease Maternal Grandfather      Neurologic Disorder Brother          age 71, Parkinson's     Coronary Artery Disease Brother      Hypertension Brother         Born 1938     Cancer - colorectal No family hx of      Prostate Cancer No family hx of          Current Outpatient Medications   Medication Sig Dispense Refill     aspirin 81 MG tablet Take 81 mg by mouth daily       Cholecalciferol (VITAMIN D3 PO) Take by mouth daily       finasteride (PROSCAR) 5 MG tablet Take 1 tablet (5 mg) by mouth daily 90 tablet 3     ibuprofen (ADVIL,MOTRIN) 200 MG tablet Take 400-600 mg by mouth every 8 hours as needed 3 at night       ipratropium (ATROVENT) 0.06 % nasal spray Spray 2 sprays into both nostrils 2 times daily as needed for rhinitis 15 mL 1     mirtazapine (REMERON) 15 MG tablet Take 2 tablets (30 mg) by mouth At Bedtime 60 tablet 1     MULTI-VITAMIN OR TABS 1 tab daily  0     Multiple Vitamins-Minerals (PRESERVISION AREDS 2 PO) Take by mouth daily       omeprazole (PRILOSEC OTC) 20 MG EC tablet Take 1 tablet (20 mg) by mouth daily (rash and GI adverse effects noted on capsules) 90 tablet 3     sildenafil (VIAGRA) 100 MG tablet Take 1 tablet (100 mg) by mouth daily as needed 30 min to 4 hrs before sex. 6 tablet 11     traZODone " "(DESYREL) 50 MG tablet Take 2-3 tablets (100-150 mg) by mouth nightly as needed for sleep 90 tablet 1     BP Readings from Last 3 Encounters:   04/15/19 110/62   04/05/19 112/64   02/22/19 112/62    Wt Readings from Last 3 Encounters:   04/15/19 77.1 kg (170 lb)   04/05/19 78 kg (172 lb)   02/22/19 77.6 kg (171 lb)                    ROS:  CONSTITUTIONAL:POSITIVE  for anorexia, fatigue and weight loss  ENT/MOUTH: NEGATIVE for ear, mouth and throat problems  RESP: NEGATIVE for significant cough or SOB  CV: NEGATIVE for chest pain, palpitations or peripheral edema    OBJECTIVE:     /62 (BP Location: Right arm, Patient Position: Sitting, Cuff Size: Adult Large)   Pulse 91   Temp 97.6  F (36.4  C) (Oral)   Resp 16   Ht 1.803 m (5' 11\")   Wt 77.1 kg (170 lb)   SpO2 99%   BMI 23.71 kg/m    Body mass index is 23.71 kg/m .  GENERAL: alert, frail and elderly  EYES: PERRL, EOMI, visual fields normal and conjunctiva/corneas- subconjunctival hemorrhage OS        ASSESSMENT/PLAN:               ICD-10-CM    1. Subconjunctival hemorrhage of left eye H11.32        Patient Instructions   Scleral hemorrhage L eye, no vision loss  Avoid any lifting or straining  Eye appt to be arranged    Christiana Butler, NP  [facility]        "

## 2019-04-15 NOTE — PATIENT INSTRUCTIONS
Scleral hemorrhage L eye, no vision loss  Avoid any lifting or straining  Eye appt to be arranged    Christiana Butler CNP

## 2019-05-24 ENCOUNTER — OFFICE VISIT (OUTPATIENT)
Dept: INTERNAL MEDICINE | Facility: CLINIC | Age: 79
End: 2019-05-24
Payer: MEDICARE

## 2019-05-24 VITALS
HEIGHT: 71 IN | RESPIRATION RATE: 16 BRPM | HEART RATE: 86 BPM | SYSTOLIC BLOOD PRESSURE: 110 MMHG | TEMPERATURE: 97.6 F | DIASTOLIC BLOOD PRESSURE: 62 MMHG | WEIGHT: 170 LBS | BODY MASS INDEX: 23.8 KG/M2 | OXYGEN SATURATION: 95 %

## 2019-05-24 DIAGNOSIS — R63.4 LOSS OF WEIGHT: ICD-10-CM

## 2019-05-24 DIAGNOSIS — G47.00 INSOMNIA, UNSPECIFIED TYPE: ICD-10-CM

## 2019-05-24 DIAGNOSIS — F43.21 GRIEF REACTION: Primary | ICD-10-CM

## 2019-05-24 PROCEDURE — 99214 OFFICE O/P EST MOD 30 MIN: CPT | Performed by: INTERNAL MEDICINE

## 2019-05-24 RX ORDER — TRAZODONE HYDROCHLORIDE 50 MG/1
100-150 TABLET, FILM COATED ORAL
Qty: 90 TABLET | Refills: 3 | Status: SHIPPED | OUTPATIENT
Start: 2019-05-24

## 2019-05-24 RX ORDER — MIRTAZAPINE 15 MG/1
30 TABLET, FILM COATED ORAL AT BEDTIME
Qty: 60 TABLET | Refills: 3 | Status: SHIPPED | OUTPATIENT
Start: 2019-05-24

## 2019-05-24 ASSESSMENT — MIFFLIN-ST. JEOR: SCORE: 1513.24

## 2019-05-24 NOTE — PATIENT INSTRUCTIONS
Would recommend going ahead with the endoscopy and colonoscopy as recommended by Dr Ge, and then seeing him back in the office.     Another idea would be to see our mental health prescriber, Inga Camp, with the notion that perhaps situational stress in playing a role in some of these problems like low appetite, previous weight loss, etc.   Someone should be contacting you to help schedule.     No med changes made today. For now, would continue current meds.     See me back in about 2-3 months, sooner if problems.

## 2019-05-24 NOTE — PROGRESS NOTES
"Subjective     Jack Bro is a 79 year old male who presents to clinic today for the following health issues:    HPI     Abdominal pains  Lower abdominal pains have improved slightly but he now experiences \"prickly\" sensations in the upper thighs bilaterally. BM's have been normal. Patient seen by Dr. Ge of GI on 4/11/2019. Abdominal CT scan 1/17/2019 was unremarkable. Dr. Ge had no clear etiology for pains and recommended an endoscopy and colonoscopy for further evaluation. He also recommended continuing with omeprazole, decreasing aspirin dosage, and a high fiber diet. He has been unable to schedule the procedures due to his busy schedule. Dr. Ge's note indicated that he questioned whether stress could be playing a factor to symptoms.     Loss of appetite    Wt Readings from Last 3 Encounters:   05/24/19 77.1 kg (170 lb)   04/15/19 77.1 kg (170 lb)   04/05/19 78 kg (172 lb)     Weight has remained stable. He continues to have little appetite and will get full with few bites of food, but he continues to force himself to eat.     Insomnia  He continues to wake up often at night and has difficulty falling back asleep. He reports frequent nocturia. He takes Remeron 30 mg and Trazodone 100 mg nightly.     Situational stress  Patient shares that his wife's health is deteriorating (recently diagnosed with lymphoma, declined treatment) and they are both living in a nursing home. His wife has not eaten in 3 days and she is receiving hospice care. This has been very difficult for him. Through all of this he is also in the process of selling their town home.       Reviewed and updated as needed this visit by Provider         Review of Systems   No dyspnea or cough. No chest discomfort, dizziness or palpitations.   Abdominal pains somewhat improved. No diarrhea, abdominal pain or rectal bleeding.   No acute problems with vision or speech, lateralizing weakness or paresthesias.    ROS: as above or negative for " "Constitutional, Respiratory, CV, GI, endocrine, psychiatric, neuro systems.    This document serves as a record of the services and decisions personally performed and made by Michael Ni MD. It was created on his behalf by Nilsa Ricardo, a trained medical scribe. The creation of this document is based on the provider's statements to the medical scribe.  Nilsa Ricardo May 24, 2019 2:59 PM             Objective    /62 (BP Location: Left arm, Patient Position: Sitting, Cuff Size: Adult Large)   Pulse 86   Temp 97.6  F (36.4  C) (Oral)   Resp 16   Ht 1.803 m (5' 11\")   Wt 77.1 kg (170 lb)   SpO2 95%   BMI 23.71 kg/m    Body mass index is 23.71 kg/m .     Physical Exam   GENERAL: healthy, alert and no distress  RESP: lungs clear to auscultation - no rales, rhonchi or wheezes  CV: regular rate and rhythm, normal S1 S2, no S3 or S4, no murmur, click or rub, no peripheral edema and peripheral pulses strong  MS: no gross musculoskeletal defects noted, no edema  SKIN: no suspicious lesions or rashes  NEURO: Normal strength and tone, mentation intact and speech normal  PSYCH: mentation appears normal, affect normal/bright    Diagnostic Test Results:  Labs reviewed in Epic  none         Assessment & Plan     (F43.21) Grief reaction  (primary encounter diagnosis)  Comment: Suspect anxiety and stress could be contributing to insomnia and weight loss. Highly recommended patient consider following up with Inga Zoë. No med changes today.   Plan: MENTAL HEALTH REFERRAL  - Adult; Psychiatry and        Medication Management; Psychiatry; G:         [facility] [phone].  Medication management & future         refills will be returned to G PCP upon         completion of evaluation; We bret...,         mirtazapine (REMERON) 15 MG tablet          (G47.00) Insomnia, unspecified type  Comment: Ongoing. No med changes. See above.   Plan: mirtazapine (REMERON) 15 MG tablet, " traZODone         (DESYREL) 50 MG tablet    (R63.4) Loss of weight  Comment: Ongoing loss of appetite and early satiety. Weight has remained stable. No med changes. Encouraged patient schedule endoscopy and colonoscopy as recommended per GI.   Plan: mirtazapine (REMERON) 15 MG tablet            FUTURE APPOINTMENTS:       - Follow-up visit in 2-3 months    Patient Instructions   Would recommend going ahead with the endoscopy and colonoscopy as recommended by Dr Ge, and then seeing him back in the office.     Another idea would be to see our mental health prescriber, Inga Camp, with the notion that perhaps situational stress in playing a role in some of these problems like low appetite, previous weight loss, etc.   Someone should be contacting you to help schedule.     No med changes made today. For now, would continue current meds.     See me back in about 2-3 months, sooner if problems.       The information in this document, created by the medical scribe for me, accurately reflects the services I personally performed and the decisions made by me. I have reviewed and approved this document for accuracy prior to leaving the patient care area.  May 24, 2019 3:21 PM    Michael Ni MD,   [facility]

## 2019-06-17 ENCOUNTER — TRANSFERRED RECORDS (OUTPATIENT)
Dept: HEALTH INFORMATION MANAGEMENT | Facility: CLINIC | Age: 79
End: 2019-06-17

## 2019-07-15 NOTE — TELEPHONE ENCOUNTER
"Requested Prescriptions   Pending Prescriptions Disp Refills     omeprazole (PRILOSEC) 20 MG DR capsule [Pharmacy Med Name: OMEPRAZOLE 20MG CPDR] 90 capsule 2     Sig: TAKE ONE CAPSULE BY MOUTH EVERY DAY   Last Written Prescription Date:  01/25/2019  Last Fill Quantity: 90,  # refills: 03   Last office visit: 5/24/2019 with prescribing provider:     Future Office Visit:      PPI Protocol Failed - 7/15/2019 10:32 AM        Failed - Medication is active on med list        Passed - Not on Clopidogrel (unless Pantoprazole ordered)        Passed - No diagnosis of osteoporosis on record        Passed - Recent (12 mo) or future (30 days) visit within the authorizing provider's specialty     Patient had office visit in the last 12 months or has a visit in the next 30 days with authorizing provider or within the authorizing provider's specialty.  See \"Patient Info\" tab in inbasket, or \"Choose Columns\" in Meds & Orders section of the refill encounter.              Passed - Patient is age 18 or older        "

## 2019-07-16 ENCOUNTER — TRANSFERRED RECORDS (OUTPATIENT)
Dept: HEALTH INFORMATION MANAGEMENT | Facility: CLINIC | Age: 79
End: 2019-07-16

## 2019-09-30 ENCOUNTER — HEALTH MAINTENANCE LETTER (OUTPATIENT)
Age: 79
End: 2019-09-30

## 2019-12-15 ENCOUNTER — HEALTH MAINTENANCE LETTER (OUTPATIENT)
Age: 79
End: 2019-12-15

## 2020-06-19 NOTE — TELEPHONE ENCOUNTER
Patient calls and states he woke up this morning with blood in his left eye. Denies recent trauma or injury to the eye or head. Patient states when he looks in the mirror he doesn't notice a scratch or other injury on his eye, states it is not painful. Denies vision changes. Patient states he is not on a blood thinner. Patient states the eye is not squirting or dripping blood. Patient states he did apply direct pressure, writer inquired if it had stopped bleeding, patient unsure. Patient states when he looks in the mirror there is still blood in his eyelid. Writer again inquired if it is old blood, if it is currently bleeding, or looks like a blood vessel has popped, patient is unsure. Writer advised that if direct pressure is applied to eye and bleed does not stop in 10 minutes he should be seen in the emergency room. Patient asks if primary care provider is available, writer advised he is out of the office, but could see another provider, writer again stated the emergency room may be more appropriate given his situation. Patient states he feels like the bleeding has stopped, and is willing to see another provider. Writer assisted in scheduling, and advised if patient gets worse, eye begins to actively bleed, squirt blood, or if vision changes occur, patient should go to the emergency room, patient verbalized understanding.  Next 5 appointments (look out 90 days)    Apr 15, 2019  2:00 PM CDT  SHORT with Christiana Butler NP  [facility] ([facility]) [address]  [facility] [address]  [phone]   May 24, 2019  2:20 PM CDT  SHORT with Michael Ni MD  [facility] ([facility]) [address]  [facility] [address]  [phone]        Additional Information    Negative: [1] Major bleeding (e.g., actively dripping or spurting) AND [2] can't be stopped    Negative: Knocked out (unconscious) > 1 minute    Negative:  Difficult to awaken or acting confused  (e.g., disoriented, slurred speech)    Negative: Severe neck pain    Negative: Sounds like a life-threatening emergency to the triager    Negative: Wound looks infected    Negative: Foreign body in the eye    Negative: Head injury is the primary problem    Negative: Neck injury is the primary problem    Negative: Vision is blurred or lost in either eye    Negative: Double vision or unable to look upwards    Negative: Cut on eyelid or eyeball (Exception: superficial scratch on eyelid)    Negative: [1] Bleeding AND [2] won't stop after 10 minutes of direct pressure (using correct technique)    Negative: Skin is split open or gaping  (or length > 1/4 inch or 6 mm)    Negative: Bloody or cloudy fluid behind the cornea (clear part)    Negative: Sharp object hit the eye (e.g., metallic chip or flying glass)    Negative: Object hit the eye at high speed  (e.g., paintball, BB, metal from nail hammering, something thrown from a )    Negative: Two black eyes (bilateral)    Negative: Sounds like a serious injury to the triager    Negative: [1] SEVERE pain AND [2] not improved 2 hours after pain medicine/ice packs    Negative: [1] Eye pain AND [2] light increases pain    Negative: Constant tearing or blinking    Negative: Patient keeps the eye covered or refuses to open it    Negative: Suspicious history for the injury    Negative: Patient is confused or is an unreliable provider of information (e.g., dementia, profound mental retardation, alcohol intoxication)    Negative: Eyelids swollen shut    Negative: Large swelling or bruise (>2 inches or 5 cm)    Negative: Scratch on white of the eye (sclera)    Negative: [1] Last tetanus shot > 5 years ago AND [2] DIRTY cut or scrape    Negative: [1] After 72 hours AND [2] pain not improving    Negative: [1] Eye pain or swelling AND [2] present > 7 days    Negative: Eye swelling, bruise or pain    Negative: ALSO, superficial cut  (scratch) or abrasion (scrape) is present    Protocols used: EYE INJURY-ADULT-       You can access the FollowMyHealth Patient Portal offered by Health system by registering at the following website: [email or web address]. By joining [employer]’s FollowMyHealth portal, you will also be able to view your health information using other applications (apps) compatible with our system.

## 2021-01-15 ENCOUNTER — HEALTH MAINTENANCE LETTER (OUTPATIENT)
Age: 81
End: 2021-01-15

## 2021-10-24 ENCOUNTER — HEALTH MAINTENANCE LETTER (OUTPATIENT)
Age: 81
End: 2021-10-24

## 2022-02-13 ENCOUNTER — HEALTH MAINTENANCE LETTER (OUTPATIENT)
Age: 82
End: 2022-02-13

## 2022-10-16 ENCOUNTER — HEALTH MAINTENANCE LETTER (OUTPATIENT)
Age: 82
End: 2022-10-16

## 2023-06-20 NOTE — PLAN OF CARE
Problem: Patient Care Overview  Goal: Plan of Care/Patient Progress Review  Outcome: Adequate for Discharge Date Met: 11/19/18    OBSERVATION patient END time: 1750    PRIMARY DIAGNOSIS: Inguinal hernia: NURSING  OUTPATIENT/OBSERVATION GOALS TO BE MET BEFORE DISCHARGE:  1. ADLs back to baseline: Yes    2. Activity and level of assistance: Ambulating independently.    3. Pain status: Pain free.    4. Return to near baseline physical activity: Yes     Discharge Planner Nurse   Safe discharge environment identified: Yes  Barriers to discharge: No       Entered by: Pattie Sahni 11/19/2018 4:52 PM     Please review provider order for any additional goals.   Nurse to notify provider when observation goals have been met and patient is ready for discharge.  Pt tolerated Reg diet without N & V, Had a BM today. Denies pain, A&O, VSS, spoke to MD and okay with discharging this evening. Pt is to follow up with surg. Family at bedside. Pt verbalized understanding of all medications, discharge instructions, and belongings.       
Problem: Patient Care Overview  Goal: Plan of Care/Patient Progress Review  Outcome: Improving  PRIMARY DIAGNOSIS: Hernia/SBO    OUTPATIENT/OBSERVATION GOALS TO BE MET BEFORE DISCHARGE  1. Orthostatic performed: N/A    2. Tolerating PO fluid and/or antibiotics (if applicable): Yes - Clear liquid diet at this time    3. Nausea/Vomiting/Diarrhea symptoms improved: Yes    4. Pain status: Pain free.    5. Return to near baseline physical activity: Yes    Discharge Planner Nurse   Safe discharge environment identified: Yes  Barriers to discharge: Yes       Entered by: Yohannes Klein 11/19/2018 10:09 AM     RN - VSS. Pt denying any associated symptoms. Passing flatus with no bowel movement yet. Appetite remains poor. Pt expressing reluctance to too much PO intake initially - will attempt more later in the day.     Please review provider order for any additional goals.   Nurse to notify provider when observation goals have been met and patient is ready for discharge.      
Problem: Patient Care Overview  Goal: Plan of Care/Patient Progress Review  Outcome: Improving  PRIMARY DIAGNOSIS: hernia/SBO    OUTPATIENT/OBSERVATION GOALS TO BE MET BEFORE DISCHARGE  1. Orthostatic performed: N/A    2. Tolerating PO fluid and/or antibiotics (if applicable): Yes    3. Nausea/Vomiting/Diarrhea symptoms improved: Yes    4. Pain status: Pain free.    5. Return to near baseline physical activity: Yes    Discharge Planner Nurse   Safe discharge environment identified: No  Barriers to discharge: No       Entered by: Yohannes Klein 11/19/2018 1:17 PM      RN - VSS. Associated symptoms on admission remain absent. Pt able to tolerate full liquid diet - advanced to regular. Ambulating hallways independently. Did have x1 BM. IVF SL per pt req. Anticipating discharge this evening.    Please review provider order for any additional goals.   Nurse to notify provider when observation goals have been met and patient is ready for discharge.      
Problem: Patient Care Overview  Goal: Plan of Care/Patient Progress Review  Outcome: No Change  OBS, A&O x 4, stand by assist, NPO except ice chips, no complaints of abdominal pain, IV zofran given for nausea,  mL/hr, profile not finished, will continue with POC.      
PERRL/EOMI/conjunctiva clear/normal

## 2023-08-26 ENCOUNTER — HEALTH MAINTENANCE LETTER (OUTPATIENT)
Age: 83
End: 2023-08-26

## (undated) DEVICE — LINEN ORTHO ACL PACK 5447

## (undated) DEVICE — ESU GROUND PAD ADULT W/CORD E7507

## (undated) DEVICE — ESU ELEC BLADE 2.75" COATED/INSULATED E1455

## (undated) DEVICE — Device

## (undated) DEVICE — ESU PENCIL W/HOLSTER E2350H

## (undated) DEVICE — GLOVE PROTEXIS POWDER FREE 7.5 ORTHOPEDIC 2D73ET75

## (undated) DEVICE — DAVINCI OBTURATOR 8MM BLADELESS 420023

## (undated) DEVICE — DAVINCI S CANNULA SEAL 8.5-13MM 420206

## (undated) DEVICE — SU VICRYL 4-0 PS-2 18" UND J496H

## (undated) DEVICE — SOL WATER IRRIG 1000ML BOTTLE 2F7114

## (undated) DEVICE — DAVINCI SI DRAPE ACCESSORY KIT 3-ARM 420290

## (undated) DEVICE — LUBRICANT INST KIT ENDO-LUBE 220-90

## (undated) DEVICE — BLADE KNIFE SURG 11 371111

## (undated) DEVICE — GOWN IMPERVIOUS SPECIALTY XLG/XLONG 32474

## (undated) DEVICE — DAVINCI HOT SHEARS TIP COVER  400180

## (undated) DEVICE — SU WND CLOSURE VLOC 90 ABS 3-0 VIOLET 6" CV-23 VLOCM0804

## (undated) DEVICE — BLADE CLIPPER 3M 9670

## (undated) DEVICE — PACK SET-UP STD 9102

## (undated) DEVICE — SYSTEM CLEARIFY VISUALIZATION 21-345

## (undated) DEVICE — GLOVE PROTEXIS BLUE W/NEU-THERA 8.0  2D73EB80

## (undated) DEVICE — SU DERMABOND MINI DHVM12

## (undated) DEVICE — ESU CORD MONOPOLAR 10'  E0510

## (undated) DEVICE — SU VICRYL 0 UR-6 27" J603H

## (undated) DEVICE — LIGHT HANDLE X2

## (undated) DEVICE — PREP CHLORAPREP 26ML TINTED ORANGE  260815

## (undated) RX ORDER — PHENYLEPHRINE HCL IN 0.9% NACL 1 MG/10 ML
SYRINGE (ML) INTRAVENOUS
Status: DISPENSED
Start: 2018-12-07

## (undated) RX ORDER — NEOSTIGMINE METHYLSULFATE 1 MG/ML
VIAL (ML) INJECTION
Status: DISPENSED
Start: 2018-12-07

## (undated) RX ORDER — FENTANYL CITRATE 50 UG/ML
INJECTION, SOLUTION INTRAMUSCULAR; INTRAVENOUS
Status: DISPENSED
Start: 2018-12-07

## (undated) RX ORDER — KETOROLAC TROMETHAMINE 30 MG/ML
INJECTION, SOLUTION INTRAMUSCULAR; INTRAVENOUS
Status: DISPENSED
Start: 2018-12-07

## (undated) RX ORDER — CEFAZOLIN SODIUM 1 G/3ML
INJECTION, POWDER, FOR SOLUTION INTRAMUSCULAR; INTRAVENOUS
Status: DISPENSED
Start: 2018-12-07

## (undated) RX ORDER — BUPIVACAINE HYDROCHLORIDE 5 MG/ML
INJECTION, SOLUTION EPIDURAL; INTRACAUDAL
Status: DISPENSED
Start: 2018-12-07

## (undated) RX ORDER — DEXAMETHASONE SODIUM PHOSPHATE 4 MG/ML
INJECTION, SOLUTION INTRA-ARTICULAR; INTRALESIONAL; INTRAMUSCULAR; INTRAVENOUS; SOFT TISSUE
Status: DISPENSED
Start: 2018-12-07

## (undated) RX ORDER — TAMSULOSIN HYDROCHLORIDE 0.4 MG/1
CAPSULE ORAL
Status: DISPENSED
Start: 2018-12-07

## (undated) RX ORDER — CEFAZOLIN SODIUM 2 G/100ML
INJECTION, SOLUTION INTRAVENOUS
Status: DISPENSED
Start: 2018-12-07

## (undated) RX ORDER — GLYCOPYRROLATE 0.2 MG/ML
INJECTION INTRAMUSCULAR; INTRAVENOUS
Status: DISPENSED
Start: 2018-12-07